# Patient Record
Sex: MALE | URBAN - METROPOLITAN AREA
[De-identification: names, ages, dates, MRNs, and addresses within clinical notes are randomized per-mention and may not be internally consistent; named-entity substitution may affect disease eponyms.]

---

## 2020-08-20 ENCOUNTER — OFFICE VISIT CONVERTED (OUTPATIENT)
Dept: NEUROLOGY | Facility: CLINIC | Age: 35
End: 2020-08-20
Attending: NURSE PRACTITIONER

## 2020-10-27 ENCOUNTER — OFFICE VISIT CONVERTED (OUTPATIENT)
Dept: NEUROLOGY | Facility: CLINIC | Age: 35
End: 2020-10-27
Attending: NURSE PRACTITIONER

## 2021-05-10 NOTE — H&P
History and Physical      Patient Name: Chet Cabrera   Patient ID: [id number]   Sex: Male   YOB: 1985        Visit Date: August 20, 2020    Provider: ARMAND Pruitt   Location: [facility]   Location Address: [address] [place] [address]  [id number]   Location Phone: [phone]          Chief Complaint  · Migraines      History Of Present Illness  Chet Cabrera is a 35 year old male who presents today to [facility] today referred from Nash Lozoya for evaluation of headaches.   He reports that he developed headaches within the last year. Since that time, his headaches have progressively worsened.   Currently, he reports headaches that are located temporal. He characterizes the headaches as 8/10 in severity, sharp and throbbing in nature with associated phonophobia. His headaches last 8-10 hours. He reports 18 headache days per month. He denies associated aura. He denies focal numbness, weakness, speech, and vision changes.   Triggers: Denies any known triggers   Symptoms improved by: Dark quiet room and Sleep   He states he is sleeping poorly. Reports getting 3-4 hours of sleep per night. Denies snoring. Reports unrefreshing sleep.   Prior prophylactic medications include: Flexeril   He uses abortive therapy such as:   Caffeine Use: 3 servings   Independently Reviewed: CT Head   History of Kidney Stones: No   He denies a family history of cerebral aneurysm.       Past Medical History  ***No Significant Medical History         Past Surgical History  Gallbladder         Medication List  Advil 200 mg oral tablet; cyclobenzaprine 5 mg oral tablet; Tylenol oral         Allergy List  NO KNOWN DRUG ALLERGIES       Allergies Reconciled  Family Medical History  Family history of Arthritis         Social History  Alcohol (Current some day); lives with children; ; Tobacco (Current every day); Working         Review of Systems  · Constitutional  o Denies  o :  "chills, excessive sweating, fatigue, fever, sycope/passing out, weight gain, weight loss  · Eyes  o Admits  o : changes in vision  o Denies  o : blurry vision, double vision  · HENT  o Admits  o : seasonal allergies, headaches  o Denies  o : loss of hearing, ringing in the ears, ear aches, sore throat, nasal congestion, sinus pain, nose bleeds  · Cardiovascular  o Denies  o : blood clots, swollen legs, anemia, easy burising or bleeding, transfusions  · Respiratory  o Denies  o : shortness of breath, dry cough, productive cough, pneumonia, COPD  · Gastrointestinal  o Denies  o : difficulty swallowing, reflux  · Genitourinary  o Denies  o : incontinence  · Neurologic  o Admits  o : headache, dizziness/vertigo, difficulty with sleep  o Denies  o : seizure, stroke, tremor, loss of balance, falls, numbness/tingling/paresthesia , difficulty with coordination, difficulty with dexterity, weakness  · Musculoskeletal  o Admits  o : neck stiffness/pain, muscle aches, joint pain, low back pain  o Denies  o : swollen lymph nodes, weakness, spasms, sciatica, pain radiating in arm, pain radiating in leg  · Endocrine  o Denies  o : diabetes, thyroid disorder  · Psychiatric  o Denies  o : anxiety, depression      Vitals  Date Time BP Position Site L\R Cuff Size HR RR TEMP (F) WT  HT  BMI kg/m2 BSA m2 O2 Sat HC       08/20/2020 02:09 PM        97.6         08/20/2020 02:48 /74 Sitting    56 - R   158lbs 1oz 6'  1\" 20.85 1.92           Physical Examination  · Constitutional  o Appearance  o : well-nourished, well groomed, in no apparent distress  · Eyes  o Pupils and Irises  o : Pupils equal, round, and reactive to light and accommodation bilaterally  · Respiratory  o Auscultation of Lungs  o : Lungs were clear to ascultation bilaterally. No wheezes, rhonchi or rales were appreciated.  · Cardiovascular  o Heart  o :   § Auscultation of Heart  § : Regular rate and rhythm, no murmurs, gallops or rubs were " appreciated.  o Peripheral Vascular System  o :   § Extremities  § : No peripheral edema was appreciated  · Musculoskeletal  o General  o : Normal bulk and normal tone throughout. 5/5 motor strength throughout and symmetric. Normal gait and station.  · Neurologic  o Mental Status Examination  o :   § Orientation  § : Alert and oriented to person, place, and time,  § Speech/Language  § : Intact naming, comprehension, and repetition. No dysarthria.  § Memory  § : Immediate recall is 3/3. Recall at 5 minutes is 3/3.   § Attention  § : Attention span is intact to serial 7 examination and finger tapping.   § Fund of Knowledge  § : Adequate fund of knowledge  o Cranial Nerves  o : Pupils are equal, round and reactive to light. Extraocular movements are intact. Visual fields are full. Fundoscopic examination reveals sharp disc bilaterally. Sensation in the V1-V3 distribution is intact and symmetric. Muscles of mastication are strong and symmetric. Muscles of facial expression are strong and symmetric. Hearing is intact. Palatal raise is intact and symmetric. Uvula is midline. Shoulder shrug is strong. Tongue protrudes in the midline.  o Motor Examination  o :   § RUE Strength  § : strength normal  § LUE Strength  § : strength normal  § RLE Strength  § : strength normal  § LLE Strength  § : strength normal  o Reflexes  o : 2+ reflexes throughout and symmetric. Negative Staton. Negative Babinski.   o Sensation  o : Intact sensation to light touch, pinprick, vibration and proprioception throughout.  o Gait and Station  o :   § Gait Screening  § : Normal, narrow based gait, with a normal arm swing.  o Coordination  o : Intact finger to nose and heel to shin. Rapid alternating movements are intact in the upper and lower extremities.     Tenderness with palpation of left temple.           Assessment  · New onset headache     784.0/R51  Will order CRP, Sed Rate and Lyme to further evaluate new onset headache. Has temporal  tenderness with palpation. Temporal arteritis is in the differential. Will start amitriptyline for preventative therapy of headache.     Problems Reconciled  Plan  · Orders  o CRP (Inflammation) [facility] (72578) - 784.0/R51 - 08/20/2020  o Sed rate (36851) - 784.0/R51 - 08/20/2020  o Lyme Disease (IgG/IgM Total by EIA) (87789) - 784.0/R51 - 08/20/2020  o ACO-17: Screened for tobacco use AND received tobacco cessation intervention (4004F) - - 08/25/2020  · Medications  o amitriptyline 25 mg oral tablet   SIG: take 1 tablet (25 mg) by oral route once daily at bedtime for 30 days   DISP: (30) tablets with 3 refills  Prescribed on 08/20/2020     o Medications have been Reconciled  o Transition of Care or Provider Policy  · Instructions  o Encouraged to follow-up with Primary Care Provider for preventative care.  o Call or Return if symptoms worsen or persist.  o Handouts provided regarding new medications listed above.  o Follow Up in 2 months.  o Electronically Identified Patient Education Materials Provided Electronically  · Disposition  o Call or Return if symptoms worsen or persist.            Electronically Signed by: ARMAND Pruitt -Author on August 25, 2020 09:07:28 AM

## 2021-05-13 NOTE — PROGRESS NOTES
Progress Note      Patient Name: Chet Cabrera   Patient ID: [id number]   Sex: Male   YOB: 1985    Referring Provider: Nash Lozoya    Visit Date: October 27, 2020    Provider: ARMAND Pruitt   Location: [facility]   Location Address: [address] [place] [address]  [id number]   Location Phone: [phone]          Chief Complaint  · Migraines      History Of Present Illness  Chet Cabrera is a 35 year old male who presents today to [facility] today referred from Nash Lozoya for evaluation of headaches.   He reports that he developed headaches within the last year. Since that time, his headaches have progressively worsened.   Currently, he reports headaches that are located temporal. He characterizes the headaches as 8/10 in severity, sharp and throbbing in nature with associated phonophobia. His headaches last 8-10 hours. He reports 18 headache days per month. He denies associated aura. He denies focal numbness, weakness, speech, and vision changes.   Triggers: Denies any known triggers   Symptoms improved by: Dark quiet room and Sleep   He states he is sleeping poorly. Reports getting 3-4 hours of sleep per night. Denies snoring. Reports unrefreshing sleep.   Prior prophylactic medications include: Flexeril, amitriptyline   He uses abortive therapy such as:   Caffeine Use: 3 servings   Independently Reviewed: CT Head   History of Kidney Stones: No   He denies a family history of cerebral aneurysm.   Chet Cabrera is a 35 year old male who presents today to [facility] today referred from Nash Lozoya for follow up. Is not tolerating the amitriptyline, states he's been having blurred vision and insomnia. Headache frequency has decreased to about 2 headache days per week.   Independently Reviewed: Labs       Past Medical History  ***No Significant Medical History         Past Surgical History  Gallbladder         Medication  "List  Advil 200 mg oral tablet; cyclobenzaprine 5 mg oral tablet; Tylenol oral         Allergy List  NO KNOWN DRUG ALLERGIES       Allergies Reconciled  Family Medical History  Family history of Arthritis         Social History  Alcohol (Current some day); lives with children; ; Tobacco (Current every day); Working         Review of Systems  · Constitutional  o Denies  o : chills, excessive sweating, fatigue, fever, sycope/passing out, weight gain, weight loss  · Eyes  o Admits  o : changes in vision  o Denies  o : blurry vision, double vision  · HENT  o Admits  o : seasonal allergies, headaches  o Denies  o : loss of hearing, ringing in the ears, ear aches, sore throat, nasal congestion, sinus pain, nose bleeds  · Cardiovascular  o Denies  o : blood clots, swollen legs, anemia, easy burising or bleeding, transfusions  · Respiratory  o Denies  o : shortness of breath, dry cough, productive cough, pneumonia, COPD  · Gastrointestinal  o Denies  o : difficulty swallowing, reflux  · Genitourinary  o Denies  o : incontinence  · Neurologic  o Admits  o : headache, dizziness/vertigo, difficulty with sleep  o Denies  o : seizure, stroke, tremor, loss of balance, falls, numbness/tingling/paresthesia , difficulty with coordination, difficulty with dexterity, weakness  · Musculoskeletal  o Admits  o : neck stiffness/pain, muscle aches, joint pain, low back pain  o Denies  o : swollen lymph nodes, weakness, spasms, sciatica, pain radiating in arm, pain radiating in leg  · Endocrine  o Denies  o : diabetes, thyroid disorder  · Psychiatric  o Denies  o : anxiety, depression      Vitals  Date Time BP Position Site L\R Cuff Size HR RR TEMP (F) WT  HT  BMI kg/m2 BSA m2 O2 Sat FR L/min FiO2        10/27/2020 03:28 /64 Sitting    92 - R   160lbs 0oz 6'  1\" 21.11 1.93             Physical Examination  · Constitutional  o Appearance  o : well-nourished, well groomed, in no apparent distress  · Eyes  o Pupils and " Irises  o : Pupils equal, round, and reactive to light and accommodation bilaterally  · Respiratory  o Auscultation of Lungs  o : Lungs were clear to ascultation bilaterally. No wheezes, rhonchi or rales were appreciated.  · Cardiovascular  o Heart  o :   § Auscultation of Heart  § : Regular rate and rhythm, no murmurs, gallops or rubs were appreciated.  o Peripheral Vascular System  o :   § Extremities  § : No peripheral edema was appreciated  · Musculoskeletal  o General  o : Normal bulk and normal tone throughout. 5/5 motor strength throughout and symmetric. Normal gait and station.  · Neurologic  o Mental Status Examination  o :   § Orientation  § : Alert and oriented to person, place, and time,  § Speech/Language  § : Intact naming, comprehension, and repetition. No dysarthria.  § Memory  § : Immediate recall is 3/3. Recall at 5 minutes is 3/3.   § Attention  § : Attention span is intact to serial 7 examination and finger tapping.   § Fund of Knowledge  § : Adequate fund of knowledge  o Cranial Nerves  o : Pupils are equal, round and reactive to light. Extraocular movements are intact. Visual fields are full. Fundoscopic examination reveals sharp disc bilaterally. Sensation in the V1-V3 distribution is intact and symmetric. Muscles of mastication are strong and symmetric. Muscles of facial expression are strong and symmetric. Hearing is intact. Palatal raise is intact and symmetric. Uvula is midline. Shoulder shrug is strong. Tongue protrudes in the midline.  o Motor Examination  o :   § RUE Strength  § : strength normal  § LUE Strength  § : strength normal  § RLE Strength  § : strength normal  § LLE Strength  § : strength normal  o Reflexes  o : 2+ reflexes throughout and symmetric. Negative Staton. Negative Babinski.   o Sensation  o : Intact sensation to light touch, pinprick, vibration and proprioception throughout.  o Gait and Station  o :   § Gait Screening  § : Normal, narrow based gait, with a normal  arm swing.  o Coordination  o : Intact finger to nose and heel to shin. Rapid alternating movements are intact in the upper and lower extremities.          Assessment  · New onset headache     784.0/R51  Will discontinue amitriptyline. Will start topiramate for preventative therapy of migraine. Will start Imitrex PRN for abortive therapy.      Plan  · Medications  o topiramate 50 mg oral tablet   SIG: take 1 tablet by oral route 2 times a day for 30 days one tablet qHS for 2 weeks, then BID   DISP: (60) Tablet with 5 refills  Prescribed on 10/27/2020     o Imitrex 50 mg oral tablet   SIG: Take 1 tab at HA onset, may rpt in 2 hrs. No more than 2 tabs/day or 2 days/week   DISP: (9) Tablet with 5 refills  Prescribed on 10/27/2020     o amitriptyline 25 mg oral tablet   SIG: take 1 tablet (25 mg) by oral route once daily at bedtime for 30 days   DISP: (30) tablets with 3 refills  Discontinued on 10/27/2020     o Medications have been Reconciled  o Transition of Care or Provider Policy  · Instructions  o Call or Return if symptoms persist.  o Follow up in 3 months.  · Disposition  o Call or Return if symptoms worsen or persist.            Electronically Signed by: ARMAND Pruitt -Author on October 29, 2020 08:28:54 AM

## 2021-05-14 VITALS
SYSTOLIC BLOOD PRESSURE: 165 MMHG | BODY MASS INDEX: 21.2 KG/M2 | DIASTOLIC BLOOD PRESSURE: 64 MMHG | HEIGHT: 73 IN | WEIGHT: 160 LBS | HEART RATE: 92 BPM

## 2021-05-14 VITALS
HEART RATE: 56 BPM | DIASTOLIC BLOOD PRESSURE: 74 MMHG | SYSTOLIC BLOOD PRESSURE: 128 MMHG | HEIGHT: 73 IN | TEMPERATURE: 97.6 F | BODY MASS INDEX: 20.95 KG/M2 | WEIGHT: 158.06 LBS

## 2023-05-05 ENCOUNTER — OFFICE VISIT (OUTPATIENT)
Dept: BEHAVIORAL HEALTH | Facility: CLINIC | Age: 38
End: 2023-05-05
Payer: COMMERCIAL

## 2023-05-05 VITALS
BODY MASS INDEX: 19.64 KG/M2 | SYSTOLIC BLOOD PRESSURE: 124 MMHG | DIASTOLIC BLOOD PRESSURE: 78 MMHG | HEIGHT: 73 IN | WEIGHT: 148.2 LBS | HEART RATE: 60 BPM

## 2023-05-05 DIAGNOSIS — F33.1 MODERATE EPISODE OF RECURRENT MAJOR DEPRESSIVE DISORDER: ICD-10-CM

## 2023-05-05 DIAGNOSIS — F41.1 GENERALIZED ANXIETY DISORDER: Primary | ICD-10-CM

## 2023-05-05 DIAGNOSIS — F51.01 PRIMARY INSOMNIA: ICD-10-CM

## 2023-05-05 RX ORDER — VILAZODONE HYDROCHLORIDE 20 MG/1
TABLET ORAL
COMMUNITY
Start: 2023-04-25 | End: 2023-05-10 | Stop reason: DRUGHIGH

## 2023-05-05 RX ORDER — ARIPIPRAZOLE 2 MG/1
2 TABLET ORAL DAILY
Qty: 30 TABLET | Refills: 1 | Status: SHIPPED | OUTPATIENT
Start: 2023-05-05 | End: 2023-05-10 | Stop reason: SINTOL

## 2023-05-05 RX ORDER — LOTEPREDNOL ETABONATE 5 MG/ML
SUSPENSION/ DROPS OPHTHALMIC
COMMUNITY
Start: 2023-02-16 | End: 2023-05-05

## 2023-05-05 RX ORDER — ZOLPIDEM TARTRATE 5 MG/1
TABLET ORAL
COMMUNITY
Start: 2023-03-30 | End: 2023-05-05

## 2023-05-05 RX ORDER — AMOXICILLIN 500 MG/1
CAPSULE ORAL
COMMUNITY
Start: 2023-03-04 | End: 2023-05-05

## 2023-05-05 RX ORDER — ESZOPICLONE 1 MG/1
TABLET, FILM COATED ORAL
COMMUNITY
Start: 2023-04-26
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
## 2023-05-08 ENCOUNTER — TELEPHONE (OUTPATIENT)
Dept: PSYCHIATRY | Facility: CLINIC | Age: 38
End: 2023-05-08
Payer: COMMERCIAL

## 2023-05-08 NOTE — TELEPHONE ENCOUNTER
Patient called and requested that their provider give them a call.     Patient stated that they had questions regarding the medication they were put on last week. Also had questions about the side effects.

## 2023-05-09 NOTE — PATIENT INSTRUCTIONS
1.  Please return to clinic at your next scheduled visit.  Please contact the clinic ([phone]) at least 24 hours prior in the event you need to cancel.  2.  Do no harm to yourself or others.    3.  Avoid alcohol and drugs.    4.  Take all medications as prescribed.  Please contact the clinic with any concerns. If you are in need of medication refills, please call the clinic at [phone].    5. Should you want to get in touch with your provider, ARMAND Loredo, please contact the office ([phone]), and staff will be able to page [facility] directly.  6. In the event you have personal crisis, contact the following crisis numbers: Suicide Prevention Hotline 1-158.311.1341; [facility] Helpline 4-508-829-HGCE; [facility] Emergency Room [phone]; text HELLO to 426998; or 852.     SPECIFIC RECOMMENDATIONS:     1.      Medications discussed at this encounter: VIIBRYD (VILAZODONE) Start Viibryd 10 mg by mouth daily in the morning with food for 7 days, then 20 mg by mouth daily in the morning with food to target depressed mood and anxiety. Risks, benefits, alternatives discussed with patient including diarrhea, nausea, vomiting, dry mouth and insomnia. After discussion of these risks and benefits, the patient voiced understanding and agreed to proceed.                        2.      Psychotherapy recommendations: We will continue therapy at future visits.     3.     Return to clinic: 1 week

## 2023-05-09 NOTE — TELEPHONE ENCOUNTER
"Phoned patient.  Patient has tried taking the Abilify at various times in the day and is unable to tolerate the symptoms.  At night it caused strange dreams.  During the day to cause sedation.  In the afternoon patient states he felt \"weird\"  Discontinue Abilify  We will discuss alternative medication options at appointment in a.m.    ARMAND Loredo, PMHNP-BC  [facility]  Office: [phone]    "
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
## 2023-05-09 NOTE — PATIENT INSTRUCTIONS
1.  Please return to clinic at your next scheduled visit.  Please contact the clinic ([phone]) at least 24 hours prior in the event you need to cancel.  2.  Do no harm to yourself or others.    3.  Avoid alcohol and drugs.    4.  Take all medications as prescribed.  Please contact the clinic with any concerns. If you are in need of medication refills, please call the clinic at [phone].    5. Should you want to get in touch with your provider, ARMAND Loredo, please contact the office ([phone]), and staff will be able to page Opal directly.  6. In the event you have personal crisis, contact the following crisis numbers: Suicide Prevention Hotline 1-338.531.3720; [facility] Helpline 3-066-240-KHCP; [facility] Emergency Room [phone]; text HELLO to 810084; or 389.     SPECIFIC RECOMMENDATIONS:     1.      Medications discussed at this encounter: ABILIFY (ARIPIPRAZOLE) Risks, benefits, alternatives discussed with patient including increased energy, exacerbation of irritability, akathisia, GI upset, orthostatic hypotension, increased appetite, tardive dyskinesia.  After discussion of these risks and benefits, the patient voiced understanding and agreed to proceed.                      2.      Psychotherapy recommendations: We will continue therapy at future visits.     3.     Return to clinic: Follow-up 1 week

## 2023-05-10 ENCOUNTER — OFFICE VISIT (OUTPATIENT)
Dept: PSYCHIATRY | Facility: CLINIC | Age: 38
End: 2023-05-10
Payer: COMMERCIAL

## 2023-05-10 VITALS
HEIGHT: 73 IN | HEART RATE: 73 BPM | BODY MASS INDEX: 19.96 KG/M2 | SYSTOLIC BLOOD PRESSURE: 120 MMHG | WEIGHT: 150.6 LBS | DIASTOLIC BLOOD PRESSURE: 72 MMHG

## 2023-05-10 DIAGNOSIS — F41.1 GENERALIZED ANXIETY DISORDER: ICD-10-CM

## 2023-05-10 DIAGNOSIS — F51.01 PRIMARY INSOMNIA: ICD-10-CM

## 2023-05-10 DIAGNOSIS — F33.1 MODERATE EPISODE OF RECURRENT MAJOR DEPRESSIVE DISORDER: Primary | ICD-10-CM

## 2023-05-10 PROBLEM — R68.89 FLU-LIKE SYMPTOMS: Status: ACTIVE | Noted: 2023-05-10

## 2023-05-10 PROBLEM — IMO0002 FOREIGN BODY: Status: ACTIVE | Noted: 2023-05-10

## 2023-05-10 PROBLEM — R51.9 HEADACHE: Status: ACTIVE | Noted: 2023-05-10

## 2023-05-10 PROBLEM — S63.619A SPRAIN OF FINGER, LEFT: Status: ACTIVE | Noted: 2023-05-10

## 2023-05-10 PROBLEM — R10.9 ABDOMINAL PAIN OF UNKNOWN ETIOLOGY: Status: ACTIVE | Noted: 2023-05-10

## 2023-05-10 PROBLEM — S60.222A CONTUSION OF LEFT HAND: Status: ACTIVE | Noted: 2023-05-10

## 2023-05-10 RX ORDER — VILAZODONE HYDROCHLORIDE 40 MG/1
40 TABLET ORAL DAILY
Qty: 30 TABLET | Refills: 1 | Status: SHIPPED | OUTPATIENT
Start: 2023-05-10

## 2023-05-11 ENCOUNTER — TELEPHONE (OUTPATIENT)
Dept: PSYCHIATRY | Facility: CLINIC | Age: 38
End: 2023-05-11
Payer: COMMERCIAL

## 2023-05-11 NOTE — TELEPHONE ENCOUNTER
ATTEMPTED TO CONTACT PT PER PROVIDER'S INSTRUCTIONS    PT(PATIENT) WILL NEED TO COMPLETE PHQ9/GAD7 VIA PHONE FOR Office Visit with Opal Huang APRN (05/10/2023)     NO ANSWER      LEFT VOICEMAIL WITH INSTRUCTIONS TO RETURN CALL TO OFFICE AT [phone]

## 2023-05-15 NOTE — TELEPHONE ENCOUNTER
I CALLED PT TO GO OVER PHQ AND MARGE-7.    I SPOKE WITH PT AND WENT OVER THESE ASSESSMENTS.     PT HAS COMPLETED THESE ASSESSMENTS.     ROUTED TO PROVIDER FOR REVIEW.

## 2023-05-19 ENCOUNTER — CLINICAL SUPPORT (OUTPATIENT)
Dept: FAMILY MEDICINE CLINIC | Facility: CLINIC | Age: 38
End: 2023-05-19
Payer: COMMERCIAL

## 2023-05-19 ENCOUNTER — OFFICE VISIT (OUTPATIENT)
Dept: BEHAVIORAL HEALTH | Facility: CLINIC | Age: 38
End: 2023-05-19
Payer: COMMERCIAL

## 2023-05-19 VITALS
HEIGHT: 73 IN | WEIGHT: 153.5 LBS | BODY MASS INDEX: 20.34 KG/M2 | DIASTOLIC BLOOD PRESSURE: 71 MMHG | HEART RATE: 66 BPM | SYSTOLIC BLOOD PRESSURE: 127 MMHG

## 2023-05-19 DIAGNOSIS — F51.01 PRIMARY INSOMNIA: ICD-10-CM

## 2023-05-19 DIAGNOSIS — Z51.81 MEDICATION MONITORING ENCOUNTER: ICD-10-CM

## 2023-05-19 DIAGNOSIS — F51.01 PRIMARY INSOMNIA: Primary | ICD-10-CM

## 2023-05-19 DIAGNOSIS — F33.1 MODERATE EPISODE OF RECURRENT MAJOR DEPRESSIVE DISORDER: ICD-10-CM

## 2023-05-19 DIAGNOSIS — Z79.899 MEDICATION MANAGEMENT: ICD-10-CM

## 2023-05-19 DIAGNOSIS — F41.1 GENERALIZED ANXIETY DISORDER: Primary | ICD-10-CM

## 2023-05-19 LAB
POC AMPHETAMINES: NEGATIVE
POC BARBITURATES: NEGATIVE
POC BENZODIAZEPHINES: NEGATIVE
POC COCAINE: NEGATIVE
POC METHADONE: NEGATIVE
POC METHAMPHETAMINE SCREEN URINE: NEGATIVE
POC OPIATES: NEGATIVE
POC OXYCODONE: NEGATIVE
POC PHENCYCLIDINE: NEGATIVE
POC PROPOXYPHENE: NEGATIVE
POC THC: NEGATIVE
POC TRICYCLIC ANTIDEPRESSANTS: NEGATIVE

## 2023-05-19 PROCEDURE — 80305 DRUG TEST PRSMV DIR OPT OBS: CPT | Performed by: NURSE PRACTITIONER
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
## 2023-05-19 NOTE — PATIENT INSTRUCTIONS
1.  Please return to clinic at your next scheduled visit.  Please contact the clinic ([phone]) at least 24 hours prior in the event you need to cancel.  2.  Do no harm to yourself or others.    3.  Avoid alcohol and drugs.    4.  Take all medications as prescribed.  Please contact the clinic with any concerns. If you are in need of medication refills, please call the clinic at [phone].    5. Should you want to get in touch with your provider, ARMAND Loredo, please contact the office ([phone]), and staff will be able to page Opal directly.  6. In the event you have personal crisis, contact the following crisis numbers: Suicide Prevention Hotline 1-682.245.6796; MAXINE Helpline 4-896-571-GPHR; [facility] [phone]; text HELLO to 992445; or 844.     SPECIFIC RECOMMENDATIONS:     1.      Medications discussed at this encounter: LUNESTA (ESZOPICLONE) Risks, benefits, alternatives discussed with patient including sedation, dizziness, falls risk, GI upset, amnesia, grogginess the following day.  After discussion of these risks and benefits, the patient voiced understanding and agreed to proceed.                      2.      Psychotherapy recommendations: We will continue therapy at future visits.     3.     Return to clinic: 1 week

## 2023-05-19 NOTE — TELEPHONE ENCOUNTER
Medication management    Increase Lunesta to 2 mg at bedtime     UDS satisfactory  ELENA satisfactory  CSA in record

## 2023-05-22 RX ORDER — ESZOPICLONE 2 MG/1
2 TABLET, FILM COATED ORAL NIGHTLY PRN
Qty: 30 TABLET | Refills: 1 | Status: SHIPPED | OUTPATIENT
Start: 2023-05-26 | End: 2024-05-25

## 2023-05-25 ENCOUNTER — OFFICE VISIT (OUTPATIENT)
Dept: BEHAVIORAL HEALTH | Facility: CLINIC | Age: 38
End: 2023-05-25
Payer: COMMERCIAL

## 2023-05-25 VITALS
HEIGHT: 73 IN | HEART RATE: 63 BPM | DIASTOLIC BLOOD PRESSURE: 70 MMHG | SYSTOLIC BLOOD PRESSURE: 127 MMHG | WEIGHT: 152.1 LBS | BODY MASS INDEX: 20.16 KG/M2

## 2023-05-25 DIAGNOSIS — F41.1 GENERALIZED ANXIETY DISORDER: Primary | ICD-10-CM

## 2023-05-25 DIAGNOSIS — Z79.899 MEDICATION MANAGEMENT: ICD-10-CM

## 2023-05-25 DIAGNOSIS — F33.1 MODERATE EPISODE OF RECURRENT MAJOR DEPRESSIVE DISORDER: ICD-10-CM

## 2023-05-25 DIAGNOSIS — F51.01 PRIMARY INSOMNIA: ICD-10-CM

## 2023-05-25 NOTE — PATIENT INSTRUCTIONS
1.  Please return to clinic at your next scheduled visit.  Please contact the clinic ([phone]) at least 24 hours prior in the event you need to cancel.  2.  Do no harm to yourself or others.    3.  Avoid alcohol and drugs.    4.  Take all medications as prescribed.  Please contact the clinic with any concerns. If you are in need of medication refills, please call the clinic at [phone].    5. Should you want to get in touch with your provider, ARMAND Loredo, please contact the office ([phone]), and staff will be able to page Opal directly.  6. In the event you have personal crisis, contact the following crisis numbers: Suicide Prevention Hotline 1-214.283.6644; MAXINE Helpline 2-723-462-RVBS; [facility] [phone]; text HELLO to 901278; or 056.     SPECIFIC RECOMMENDATIONS:     1.      Medications discussed at this encounter:                     2.      Psychotherapy recommendations: We will continue therapy at future visits.     3.     Return to clinic:  1 Week
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
## 2023-06-09 ENCOUNTER — OFFICE VISIT (OUTPATIENT)
Dept: BEHAVIORAL HEALTH | Facility: CLINIC | Age: 38
End: 2023-06-09
Payer: COMMERCIAL

## 2023-06-09 VITALS
DIASTOLIC BLOOD PRESSURE: 70 MMHG | WEIGHT: 151 LBS | HEART RATE: 76 BPM | BODY MASS INDEX: 20.01 KG/M2 | SYSTOLIC BLOOD PRESSURE: 134 MMHG | HEIGHT: 73 IN

## 2023-06-09 DIAGNOSIS — F33.1 MODERATE EPISODE OF RECURRENT MAJOR DEPRESSIVE DISORDER: ICD-10-CM

## 2023-06-09 DIAGNOSIS — F41.1 GENERALIZED ANXIETY DISORDER: Primary | ICD-10-CM

## 2023-06-09 RX ORDER — BUPROPION HYDROCHLORIDE 150 MG/1
150 TABLET ORAL EVERY MORNING
Qty: 30 TABLET | Refills: 2 | Status: SHIPPED | OUTPATIENT
Start: 2023-06-09 | End: 2024-06-08

## 2023-06-09 NOTE — PATIENT INSTRUCTIONS
1.  Please return to clinic at your next scheduled visit.  Please contact the clinic ([phone]) at least 24 hours prior in the event you need to cancel.  2.  Do no harm to yourself or others.    3.  Avoid alcohol and drugs.    4.  Take all medications as prescribed.  Please contact the clinic with any concerns. If you are in need of medication refills, please call the clinic at [phone].    5. Should you want to get in touch with your provider, ARMAND Loredo, please contact the office ([phone]), and staff will be able to page [facility] directly.  6. In the event you have personal crisis, contact the following crisis numbers: Suicide Prevention Hotline 1-898.848.8337; [facility] Helpline 7-925-497-LJVD; [facility] [phone]; text HELLO to 090149; or 412.     SPECIFIC RECOMMENDATIONS:     1.      Medications discussed at this encounter: WELLBUTRIN XL (BUPROPION) Risks, benefits, alternatives discussed with patient including nausea, GI upset, increased energy, exacerbation of irritability, insomnia, lowering of seizure threshold.  After discussion of these risks and benefits, the patient voiced understanding and agreed to proceed.                      2.      Psychotherapy recommendations: We will continue therapy at future visits.     3.     Return to clinic:  1 Week
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
## 2023-06-16 ENCOUNTER — OFFICE VISIT (OUTPATIENT)
Dept: BEHAVIORAL HEALTH | Facility: CLINIC | Age: 38
End: 2023-06-16
Payer: COMMERCIAL

## 2023-06-16 VITALS
DIASTOLIC BLOOD PRESSURE: 73 MMHG | HEART RATE: 61 BPM | BODY MASS INDEX: 20.29 KG/M2 | SYSTOLIC BLOOD PRESSURE: 131 MMHG | WEIGHT: 153.1 LBS | HEIGHT: 73 IN

## 2023-06-16 DIAGNOSIS — F33.1 MODERATE EPISODE OF RECURRENT MAJOR DEPRESSIVE DISORDER: Primary | ICD-10-CM

## 2023-06-16 DIAGNOSIS — F41.1 GENERALIZED ANXIETY DISORDER: ICD-10-CM

## 2023-06-16 DIAGNOSIS — F51.01 PRIMARY INSOMNIA: ICD-10-CM

## 2023-06-16 NOTE — PATIENT INSTRUCTIONS
1.  Please return to clinic at your next scheduled visit.  Please contact the clinic ([phone]) at least 24 hours prior in the event you need to cancel.  2.  Do no harm to yourself or others.    3.  Avoid alcohol and drugs.    4.  Take all medications as prescribed.  Please contact the clinic with any concerns. If you are in need of medication refills, please call the clinic at [phone].    5. Should you want to get in touch with your provider, ARMAND Loredo, please contact the office ([phone]), and staff will be able to page [facility] directly.  6. In the event you have personal crisis, contact the following crisis numbers: Suicide Prevention Hotline 1-406.135.5109; [facility] Helpline 7-428-708-EQZK; [facility] Emergency Room [phone]; text HELLO to 055692; or 869.     SPECIFIC RECOMMENDATIONS:     1.      Medications discussed at this encounter: WELLBUTRIN XL (BUPROPION) Risks, benefits, alternatives discussed with patient including nausea, GI upset, increased energy, exacerbation of irritability, insomnia, lowering of seizure threshold.  After discussion of these risks and benefits, the patient voiced understanding and agreed to proceed.                      2.      Psychotherapy recommendations: We will continue therapy at future visits.     3.     Return to clinic:  1  week
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
## 2023-07-20 ENCOUNTER — OFFICE VISIT (OUTPATIENT)
Dept: BEHAVIORAL HEALTH | Facility: CLINIC | Age: 38
End: 2023-07-20
Payer: COMMERCIAL

## 2023-07-20 VITALS
DIASTOLIC BLOOD PRESSURE: 70 MMHG | HEIGHT: 73 IN | WEIGHT: 157.7 LBS | SYSTOLIC BLOOD PRESSURE: 123 MMHG | HEART RATE: 63 BPM | BODY MASS INDEX: 20.9 KG/M2

## 2023-07-20 DIAGNOSIS — F41.1 GENERALIZED ANXIETY DISORDER: ICD-10-CM

## 2023-07-20 DIAGNOSIS — F33.1 MODERATE EPISODE OF RECURRENT MAJOR DEPRESSIVE DISORDER: Primary | ICD-10-CM

## 2023-07-20 DIAGNOSIS — F51.01 PRIMARY INSOMNIA: ICD-10-CM

## 2023-07-20 NOTE — PATIENT INSTRUCTIONS
1.  Please return to clinic at your next scheduled visit.  Please contact the clinic ([phone]) at least 24 hours prior in the event you need to cancel.  2.  Do no harm to yourself or others.    3.  Avoid alcohol and drugs.    4.  Take all medications as prescribed.  Please contact the clinic with any concerns. If you are in need of medication refills, please call the clinic at [phone].    5. Should you want to get in touch with your provider, ARMAND Loredo, please contact the office ([phone]), and staff will be able to page [facility] directly.  6. In the event you have personal crisis, contact the following crisis numbers: Suicide Prevention Hotline 1-384.316.9675; [facility] Helpline 2-471-984-MSFW; [facility] Emergency Room [phone]; text HELLO to 839474; or 429.     SPECIFIC RECOMMENDATIONS:     1.      Medications discussed at this encounter:                     2.      Psychotherapy recommendations: We will continue therapy at future visits.    Outgoing ambulatory referral for individual psychotherapy to  [address] [id number]  Phone [phone]  [email or web address]    3.     Return to clinic:  2 Weeks
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
## 2023-08-01 DIAGNOSIS — F51.01 PRIMARY INSOMNIA: ICD-10-CM

## 2023-08-01 RX ORDER — ESZOPICLONE 2 MG/1
TABLET, FILM COATED ORAL
Qty: 30 TABLET | Refills: 2 | Status: SHIPPED | OUTPATIENT
Start: 2023-08-01

## 2023-08-03 ENCOUNTER — OFFICE VISIT (OUTPATIENT)
Dept: BEHAVIORAL HEALTH | Facility: CLINIC | Age: 38
End: 2023-08-03
Payer: COMMERCIAL

## 2023-08-03 VITALS
DIASTOLIC BLOOD PRESSURE: 71 MMHG | HEART RATE: 68 BPM | WEIGHT: 155 LBS | BODY MASS INDEX: 20.54 KG/M2 | SYSTOLIC BLOOD PRESSURE: 123 MMHG | HEIGHT: 73 IN

## 2023-08-03 DIAGNOSIS — F51.01 PRIMARY INSOMNIA: ICD-10-CM

## 2023-08-03 DIAGNOSIS — F41.1 GENERALIZED ANXIETY DISORDER: ICD-10-CM

## 2023-08-03 DIAGNOSIS — F33.1 MODERATE EPISODE OF RECURRENT MAJOR DEPRESSIVE DISORDER: Primary | ICD-10-CM

## 2023-08-03 RX ORDER — BUPROPION HYDROCHLORIDE 150 MG/1
150 TABLET ORAL EVERY MORNING
Qty: 30 TABLET | Refills: 1 | Status: SHIPPED | OUTPATIENT
Start: 2023-08-03 | End: 2024-08-02

## 2023-08-03 RX ORDER — VILAZODONE HYDROCHLORIDE 40 MG/1
40 TABLET ORAL DAILY
Qty: 30 TABLET | Refills: 1 | Status: SHIPPED | OUTPATIENT
Start: 2023-08-03
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
## 2023-08-03 NOTE — PATIENT INSTRUCTIONS
1.  Please return to clinic at your next scheduled visit.  Please contact the clinic ([phone]) at least 24 hours prior in the event you need to cancel.  2.  Do no harm to yourself or others.    3.  Avoid alcohol and drugs.    4.  Take all medications as prescribed.  Please contact the clinic with any concerns. If you are in need of medication refills, please call the clinic at [phone].    5. Should you want to get in touch with your provider, ARMAND Loredo, please contact the office ([phone]), and staff will be able to page pOal directly.  6. In the event you have personal crisis, contact the following crisis numbers: Suicide Prevention Hotline 1-850.244.2925; [facility] Helpline 8-566-488-WWHJ; [facility] Emergency Room [phone]; text HELLO to 808225; or 494.     SPECIFIC RECOMMENDATIONS:     1.      Medications discussed at this encounter:                     2.      Psychotherapy recommendations: We will continue therapy at future visits.     3.     Return to clinic:  Weeks

## 2023-08-24 ENCOUNTER — OFFICE VISIT (OUTPATIENT)
Dept: BEHAVIORAL HEALTH | Facility: CLINIC | Age: 38
End: 2023-08-24
Payer: COMMERCIAL

## 2023-08-24 VITALS
SYSTOLIC BLOOD PRESSURE: 137 MMHG | WEIGHT: 158.6 LBS | HEART RATE: 64 BPM | HEIGHT: 73 IN | BODY MASS INDEX: 21.02 KG/M2 | DIASTOLIC BLOOD PRESSURE: 67 MMHG

## 2023-08-24 DIAGNOSIS — F33.1 MODERATE EPISODE OF RECURRENT MAJOR DEPRESSIVE DISORDER: Primary | ICD-10-CM

## 2023-08-24 DIAGNOSIS — F51.01 PRIMARY INSOMNIA: ICD-10-CM

## 2023-08-24 DIAGNOSIS — F41.1 GENERALIZED ANXIETY DISORDER: ICD-10-CM
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
## 2023-08-30 NOTE — PATIENT INSTRUCTIONS
1.  Please return to clinic at your next scheduled visit.  Please contact the clinic ([phone]) at least 24 hours prior in the event you need to cancel.  2.  Do no harm to yourself or others.    3.  Avoid alcohol and drugs.    4.  Take all medications as prescribed.  Please contact the clinic with any concerns. If you are in need of medication refills, please call the clinic at [phone].    5. Should you want to get in touch with your provider, ARMAND Loredo, please contact the office ([phone]), and staff will be able to page Op[facility] directly.  6. In the event you have personal crisis, contact the following crisis numbers: Suicide Prevention Hotline 1-838.159.7899; [facility] Helpline 9-061-798-SIFZ; [facility] Emergency Room [phone]; text HELLO to 537165; or 718.     SPECIFIC RECOMMENDATIONS:     1.      Medications discussed at this encounter:     No orders of the defined types were placed in this encounter.                      2.      Psychotherapy recommendations: We will continue therapy at future visits.     3.     Return to clinic: Return in about 2 weeks (around 9/7/2023) for Next scheduled follow up, Video visit.

## 2023-09-07 ENCOUNTER — OFFICE VISIT (OUTPATIENT)
Dept: BEHAVIORAL HEALTH | Facility: CLINIC | Age: 38
End: 2023-09-07
Payer: COMMERCIAL

## 2023-09-07 VITALS
HEART RATE: 60 BPM | DIASTOLIC BLOOD PRESSURE: 75 MMHG | WEIGHT: 163.8 LBS | HEIGHT: 73 IN | BODY MASS INDEX: 21.71 KG/M2 | SYSTOLIC BLOOD PRESSURE: 127 MMHG

## 2023-09-07 DIAGNOSIS — F41.1 GENERALIZED ANXIETY DISORDER: ICD-10-CM

## 2023-09-07 DIAGNOSIS — F51.01 PRIMARY INSOMNIA: ICD-10-CM

## 2023-09-07 DIAGNOSIS — F33.1 MODERATE EPISODE OF RECURRENT MAJOR DEPRESSIVE DISORDER: Primary | ICD-10-CM

## 2023-09-07 NOTE — PROGRESS NOTES
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 text in this note has been reviewed and is accurate as of 09/18/23

## 2023-09-19 NOTE — PATIENT INSTRUCTIONS
1.  Please return to clinic at your next scheduled visit.  Please contact the clinic ([phone]) at least 24 hours prior in the event you need to cancel.  2.  Do no harm to yourself or others.    3.  Avoid alcohol and drugs.    4.  Take all medications as prescribed.  Please contact the clinic with any concerns. If you are in need of medication refills, please call the clinic at [phone].    5. Should you want to get in touch with your provider, ARMAND Loredo, please contact the office ([phone]), and staff will be able to page Op[facility] directly.  6. In the event you have personal crisis, contact the following crisis numbers: Suicide Prevention Hotline 1-447.420.4946; [facility] Helpline 4-165-238-YUGJ; [facility] Emergency Room [phone]; text HELLO to 567999; or 635.     SPECIFIC RECOMMENDATIONS:     1.      Medications discussed at this encounter:     No orders of the defined types were placed in this encounter.                      2.      Psychotherapy recommendations: We will continue therapy at future visits.     3.     Return to clinic: Return in about 2 weeks (around 9/21/2023) for Next scheduled follow up.

## 2023-09-21 ENCOUNTER — OFFICE VISIT (OUTPATIENT)
Dept: BEHAVIORAL HEALTH | Facility: CLINIC | Age: 38
End: 2023-09-21
Payer: COMMERCIAL

## 2023-09-21 VITALS
SYSTOLIC BLOOD PRESSURE: 137 MMHG | HEIGHT: 73 IN | OXYGEN SATURATION: 97 % | WEIGHT: 162.6 LBS | HEART RATE: 71 BPM | BODY MASS INDEX: 21.55 KG/M2 | DIASTOLIC BLOOD PRESSURE: 80 MMHG

## 2023-09-21 DIAGNOSIS — F51.01 PRIMARY INSOMNIA: ICD-10-CM

## 2023-09-21 DIAGNOSIS — F41.1 GENERALIZED ANXIETY DISORDER: ICD-10-CM

## 2023-09-21 DIAGNOSIS — F33.1 MODERATE EPISODE OF RECURRENT MAJOR DEPRESSIVE DISORDER: Primary | ICD-10-CM

## 2023-09-21 NOTE — PROGRESS NOTES
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 guns/weapons, no present SI, no history of suicide attempts or self-harm in the past, minimal AODA, healthcare seeking, future orientation, willingness to engage in care.    Risk assessment could be further elevated in the event of treatment noncompliance and/or AODA.  Labs/studies: No labs/studies ordered at this time  Medications: No orders of the defined types were placed in this encounter.     Medication Education:   VIIBRYD (VILAZODONE) Start Viibryd 10 mg by mouth daily in the morning with food for 7 days, then 20 mg by mouth daily in the morning with food to target depressed mood and anxiety. Risks, benefits, alternatives discussed with patient including diarrhea, nausea, vomiting, dry mouth and insomnia. After discussion of these risks and benefits, the patient voiced understanding and agreed to proceed.    WELLBUTRIN XL (BUPROPION) Risks, benefits, alternatives discussed with patient including nausea, GI upset, increased energy, exacerbation of irritability, insomnia, lowering of seizure threshold.  After discussion of these risks and benefits, the patient voiced understanding and agreed to proceed.  LUNESTA (ESZOPICLONE) Risks, benefits, alternatives discussed with patient including sedation, dizziness, falls risk, GI upset, amnesia, grogginess the following day.  After discussion of these risks and benefits, the patient voiced understanding and agreed to  proceed.    Follow-up: Return in about 3 weeks (around 10/12/2023) for Next scheduled follow up.         This document has been electronically signed by ARMAND Loredo  September 23, 2023 15:44 EDT    Please note that portions of this note were completed with a voice recognition program.  Copied text in this note has been reviewed and is accurate as of 09/23/23

## 2023-09-23 NOTE — PATIENT INSTRUCTIONS
1.  Please return to clinic at your next scheduled visit.  Please contact the clinic ([phone]) at least 24 hours prior in the event you need to cancel.  2.  Do no harm to yourself or others.    3.  Avoid alcohol and drugs.    4.  Take all medications as prescribed.  Please contact the clinic with any concerns. If you are in need of medication refills, please call the clinic at [phone].    5. Should you want to get in touch with your provider, ARMAND Loredo, please contact the office ([phone]), and staff will be able to page [facility] directly.  6. In the event you have personal crisis, contact the following crisis numbers: Suicide Prevention Hotline 1-881.263.3942; [facility] Helpline 0-195-502-WYAP; [facility] Emergency Room [phone]; text HELLO to 905068; or 153.     SPECIFIC RECOMMENDATIONS:     1.      Medications discussed at this encounter:     No orders of the defined types were placed in this encounter.                      2.      Psychotherapy recommendations: We will continue therapy at future visits.     3.     Return to clinic: Return in about 3 weeks (around 10/12/2023) for Next scheduled follow up.

## 2023-10-19 NOTE — PROGRESS NOTES
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 concerns.    Continue psychotherapeutic modalities as indicated.    TREATMENT PLAN/GOALS:  Treatment plan: Continue supportive psychotherapy efforts and medications as indicated. Continue to challenge patterns of living conducive to pathology, strengthen defenses, promote problems solving, restore adaptive functioning and provide symptom relief. Treatment and medication options discussed during today's visit. Patient acknowledged and verbally consented to continue with current treatment plan and was educated on the importance of compliance with treatment and follow-up appointments.  Functional status:Good  Prognosis: Good  Progress: Continued improvement    Safety: No acute safety concerns.   Psychotherapy:      40 minutes of supportive psychotherapy with goal to strengthen defenses, promote problems solving, restore adaptive functioning and provide symptom relief. The therapeutic alliance was strengthened to encourage the patient to express their thoughts and feelings. Esteem building was enhanced through praise, reassurance, normalizing and encouragement. Coping skills were enhanced to build distress tolerance skills and emotional regulation. Allowed patient to freely discuss issues without interruption or judgement with unconditional  positive regard, active listening skills, and empathy. Provided a safe, confidential environment to facilitate the development of a positive therapeutic relationship and encourage open, honest communication. Assisted patient in identifying risk factors which would indicate the need for higher level of care including thoughts to harm self or others and/or self-harming behavior and encouraged patient to contact this office, call 911, or present to the nearest emergency room should any of these events occur. Assisted patient in processing session content; acknowledged and normalized patient’s thoughts, feelings, and concerns by utilizing a person-centered approach in efforts to build appropriate rapport and a positive therapeutic relationship with open and honest communication. Patient given education on medication side effects, diagnosis/illness and relapse symptoms. Plan to continue supportive psychotherapy in next appointment to provide symptom relief.      Risk Assessment: Risk of self-harm acutely and chronically is moderate.    Risk factors include anxiety disorder, mood disorder, and recent psychosocial stressors.   Protective factors include no family history, denies access to guns/weapons, no present SI, no history of suicide attempts or self-harm in the past, minimal AODA, healthcare seeking, future orientation, willingness to engage in care.    Risk assessment could be further elevated in the event of treatment noncompliance and/or AODA.  Labs/studies: No labs/studies ordered at this time  Medications:   New Medications Ordered This Visit   Medications    buPROPion XL (Wellbutrin XL) 150 MG 24 hr tablet     Sig: Take 1 tablet by mouth Every Morning.     Dispense:  30 tablet     Refill:  1    vilazodone (Viibryd) 40 MG tablet tablet     Sig: Take 1 tablet by mouth Daily.     Dispense:  30 tablet     Refill:  1      Medication Education:   VIIBRYD (VILAZODONE) Start Viibryd 10 mg by mouth daily in the morning  with food for 7 days, then 20 mg by mouth daily in the morning with food to target depressed mood and anxiety. Risks, benefits, alternatives discussed with patient including diarrhea, nausea, vomiting, dry mouth and insomnia. After discussion of these risks and benefits, the patient voiced understanding and agreed to proceed.    WELLBUTRIN XL (BUPROPION) Risks, benefits, alternatives discussed with patient including nausea, GI upset, increased energy, exacerbation of irritability, insomnia, lowering of seizure threshold.  After discussion of these risks and benefits, the patient voiced understanding and agreed to proceed.  LUNESTA (ESZOPICLONE) Risks, benefits, alternatives discussed with patient including sedation, dizziness, falls risk, GI upset, amnesia, grogginess the following day.  After discussion of these risks and benefits, the patient voiced understanding and agreed to proceed.      Follow-up: Return in about 3 weeks (around 11/10/2023) for Next scheduled follow up.         This document has been electronically signed by ARMAND Loredo  October 20, 2023 21:49 EDT    Please note that portions of this note were completed with a voice recognition program.  Copied text in this note has been reviewed and is accurate as of 10/20/23

## 2023-10-20 ENCOUNTER — CLINICAL SUPPORT (OUTPATIENT)
Dept: FAMILY MEDICINE CLINIC | Facility: CLINIC | Age: 38
End: 2023-10-20
Payer: COMMERCIAL

## 2023-10-20 ENCOUNTER — OFFICE VISIT (OUTPATIENT)
Dept: BEHAVIORAL HEALTH | Facility: CLINIC | Age: 38
End: 2023-10-20
Payer: COMMERCIAL

## 2023-10-20 ENCOUNTER — TELEPHONE (OUTPATIENT)
Dept: BEHAVIORAL HEALTH | Facility: CLINIC | Age: 38
End: 2023-10-20

## 2023-10-20 VITALS
BODY MASS INDEX: 22.03 KG/M2 | HEART RATE: 63 BPM | WEIGHT: 166.2 LBS | DIASTOLIC BLOOD PRESSURE: 74 MMHG | HEIGHT: 73 IN | OXYGEN SATURATION: 98 % | SYSTOLIC BLOOD PRESSURE: 126 MMHG

## 2023-10-20 DIAGNOSIS — F33.1 MODERATE EPISODE OF RECURRENT MAJOR DEPRESSIVE DISORDER: Primary | ICD-10-CM

## 2023-10-20 DIAGNOSIS — F51.01 PRIMARY INSOMNIA: ICD-10-CM

## 2023-10-20 DIAGNOSIS — F41.1 GENERALIZED ANXIETY DISORDER: ICD-10-CM

## 2023-10-20 DIAGNOSIS — Z79.899 MEDICATION MANAGEMENT: ICD-10-CM

## 2023-10-20 DIAGNOSIS — Z79.899 MEDICATION MANAGEMENT: Primary | ICD-10-CM

## 2023-10-20 RX ORDER — VILAZODONE HYDROCHLORIDE 40 MG/1
40 TABLET ORAL DAILY
Qty: 30 TABLET | Refills: 1 | Status: SHIPPED | OUTPATIENT
Start: 2023-10-20

## 2023-10-20 RX ORDER — BUPROPION HYDROCHLORIDE 150 MG/1
150 TABLET ORAL EVERY MORNING
Qty: 30 TABLET | Refills: 1 | Status: SHIPPED | OUTPATIENT
Start: 2023-10-20 | End: 2024-10-19

## 2023-10-20 NOTE — PATIENT INSTRUCTIONS
1.  Please return to clinic at your next scheduled visit.  Please contact the clinic ([phone]) at least 24 hours prior in the event you need to cancel.  2.  Do no harm to yourself or others.    3.  Avoid alcohol and drugs.    4.  Take all medications as prescribed.  Please contact the clinic with any concerns. If you are in need of medication refills, please call the clinic at [phone].    5. Should you want to get in touch with your provider, ARMAND Loredo, please contact the office ([phone]), and staff will be able to page Opal directly.  6. In the event you have personal crisis, contact the following crisis numbers: Suicide Prevention Hotline 1-462.557.3519; [facility] Helpline 2-964-369-QYSJ; [facility] Emergency Room [phone]; text HELLO to 354836; or 892.     SPECIFIC RECOMMENDATIONS:     1.      Medications discussed at this encounter:     New Medications Ordered This Visit   Medications    buPROPion XL (Wellbutrin XL) 150 MG 24 hr tablet     Sig: Take 1 tablet by mouth Every Morning.     Dispense:  30 tablet     Refill:  1    vilazodone (Viibryd) 40 MG tablet tablet     Sig: Take 1 tablet by mouth Daily.     Dispense:  30 tablet     Refill:  1                       2.      Psychotherapy recommendations: We will continue therapy at future visits.     3.     Return to clinic: No follow-ups on file.

## 2023-11-08 DIAGNOSIS — F51.01 PRIMARY INSOMNIA: ICD-10-CM

## 2023-11-08 NOTE — TELEPHONE ENCOUNTER
eszopiclone (LUNESTA) 2 MG tablet (08/01/2023)     Pt has upcoming appt  Appointment with Opal Huang APRN (11/10/2023)     Medication order pended.

## 2023-11-09 RX ORDER — ESZOPICLONE 2 MG/1
TABLET, FILM COATED ORAL
Qty: 30 TABLET | Refills: 2 | Status: SHIPPED | OUTPATIENT
Start: 2023-11-17

## 2023-11-09 NOTE — PROGRESS NOTES
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 facility-administered medications for this visit.       Problem List:  Patient Active Problem List   Diagnosis    Abdominal pain of unknown etiology    Contusion of left hand    Flu-like symptoms    Foreign body    Headache    Sprain of finger, left       Allergy:   No Known Allergies     Discontinued Medications:  There are no discontinued medications.      PLAN:   Presentation seems most consistent with DSM-V criteria for:  Diagnoses and all orders for this visit:    1. Moderate episode of recurrent major depressive disorder (Primary)    2. Generalized anxiety disorder    3. Primary insomnia       Continue Viibryd 40 mg daily  Continue Lunesta 2 mg at bedtime  Continue bupropion  mg daily  Follow up 3 weeks  Discussed medication options and treatment plan of prescribed medication as well as the risks, benefits, and side effects.  Patient verbalized understanding and is agreeable to this plan.   Patient is agreeable to call the office with any worsening of symptoms or onset of side effects.   Patient is agreeable to call 911 or go to the nearest ER should he/she begin having SI/HI.   Addressed all questions and concerns.    Continue psychotherapeutic modalities as indicated.    TREATMENT PLAN/GOALS:  Treatment plan: Continue  supportive psychotherapy efforts and medications as indicated. Continue to challenge patterns of living conducive to pathology, strengthen defenses, promote problems solving, restore adaptive functioning and provide symptom relief. Treatment and medication options discussed during today's visit. Patient acknowledged and verbally consented to continue with current treatment plan and was educated on the importance of compliance with treatment and follow-up appointments.  Functional status:Good  Prognosis: Good  Progress: Continued improvement    Safety: No acute safety concerns.   Psychotherapy:      45 minutes of supportive psychotherapy with goal to strengthen defenses, promote problems solving, restore adaptive functioning and provide symptom relief. The therapeutic alliance was strengthened to encourage the patient to express their thoughts and feelings. Esteem building was enhanced through praise, reassurance, normalizing and encouragement. Coping skills were enhanced to build distress tolerance skills and emotional regulation. Allowed patient to freely discuss issues without interruption or judgement with unconditional positive regard, active listening skills, and empathy. Provided a safe, confidential environment to facilitate the development of a positive therapeutic relationship and encourage open, honest communication. Assisted patient in identifying risk factors which would indicate the need for higher level of care including thoughts to harm self or others and/or self-harming behavior and encouraged patient to contact this office, call 911, or present to the nearest emergency room should any of these events occur. Assisted patient in processing session content; acknowledged and normalized patient’s thoughts, feelings, and concerns by utilizing a person-centered approach in efforts to build appropriate rapport and a positive therapeutic relationship with open and honest communication. Patient given  education on medication side effects, diagnosis/illness and relapse symptoms. Plan to continue supportive psychotherapy in next appointment to provide symptom relief.      Risk Assessment: Risk of self-harm acutely and chronically is moderate.    Risk factors include anxiety disorder, mood disorder, and recent psychosocial stressors.   Protective factors include no family history, denies access to guns/weapons, no present SI, no history of suicide attempts or self-harm in the past, minimal AODA, healthcare seeking, future orientation, willingness to engage in care.    Risk assessment could be further elevated in the event of treatment noncompliance and/or AODA.  Labs/studies: No labs/studies ordered at this time  Medications: No orders of the defined types were placed in this encounter.     Medication Education:   VIIBRYD (VILAZODONE) Start Viibryd 10 mg by mouth daily in the morning with food for 7 days, then 20 mg by mouth daily in the morning with food to target depressed mood and anxiety. Risks, benefits, alternatives discussed with patient including diarrhea, nausea, vomiting, dry mouth and insomnia. After discussion of these risks and benefits, the patient voiced understanding and agreed to proceed.    WELLBUTRIN XL (BUPROPION) Risks, benefits, alternatives discussed with patient including nausea, GI upset, increased energy, exacerbation of irritability, insomnia, lowering of seizure threshold.  After discussion of these risks and benefits, the patient voiced understanding and agreed to proceed.  LUNESTA (ESZOPICLONE) Risks, benefits, alternatives discussed with patient including sedation, dizziness, falls risk, GI upset, amnesia, grogginess the following day.  After discussion of these risks and benefits, the patient voiced understanding and agreed to proceed.    ELENA reviewed as expected.  Follow-up: Return in about 3 weeks (around 12/1/2023) for Next scheduled follow up.         This document has been  electronically signed by ARMAND Loredo  November 15, 2023 19:23 EST    Please note that portions of this note were completed with a voice recognition program.  Copied text in this note has been reviewed and is accurate as of 11/15/23

## 2023-11-10 ENCOUNTER — OFFICE VISIT (OUTPATIENT)
Dept: BEHAVIORAL HEALTH | Facility: CLINIC | Age: 38
End: 2023-11-10

## 2023-11-10 VITALS
WEIGHT: 165.8 LBS | HEART RATE: 72 BPM | SYSTOLIC BLOOD PRESSURE: 137 MMHG | HEIGHT: 73 IN | BODY MASS INDEX: 21.98 KG/M2 | OXYGEN SATURATION: 100 % | DIASTOLIC BLOOD PRESSURE: 79 MMHG

## 2023-11-10 DIAGNOSIS — F33.1 MODERATE EPISODE OF RECURRENT MAJOR DEPRESSIVE DISORDER: Primary | ICD-10-CM

## 2023-11-10 DIAGNOSIS — F51.01 PRIMARY INSOMNIA: ICD-10-CM

## 2023-11-10 DIAGNOSIS — F41.1 GENERALIZED ANXIETY DISORDER: ICD-10-CM

## 2023-11-16 NOTE — PATIENT INSTRUCTIONS
1.  Please return to clinic at your next scheduled visit.  Please contact the clinic ([phone]) at least 24 hours prior in the event you need to cancel.  2.  Do no harm to yourself or others.    3.  Avoid alcohol and drugs.    4.  Take all medications as prescribed.  Please contact the clinic with any concerns. If you are in need of medication refills, please call the clinic at [phone].    5. Should you want to get in touch with your provider, ARMAND Loredo, please contact the office ([phone]), and staff will be able to page [facility] directly.  6. In the event you have personal crisis, contact the following crisis numbers: Suicide Prevention Hotline 1-715.845.9826; [facility] Helpline 6-217-631-RPOE; [facility] Emergency Room [phone]; text HELLO to 613715; or 512.     SPECIFIC RECOMMENDATIONS:     1.      Medications discussed at this encounter:     No orders of the defined types were placed in this encounter.                      2.      Psychotherapy recommendations: We will continue therapy at future visits.     3.     Return to clinic: Return in about 3 weeks (around 12/1/2023) for Next scheduled follow up.

## 2023-12-07 ENCOUNTER — OFFICE VISIT (OUTPATIENT)
Dept: BEHAVIORAL HEALTH | Facility: CLINIC | Age: 38
End: 2023-12-07
Payer: COMMERCIAL

## 2023-12-07 VITALS
BODY MASS INDEX: 22.66 KG/M2 | WEIGHT: 171 LBS | HEIGHT: 73 IN | OXYGEN SATURATION: 98 % | SYSTOLIC BLOOD PRESSURE: 129 MMHG | HEART RATE: 71 BPM | DIASTOLIC BLOOD PRESSURE: 82 MMHG

## 2023-12-07 DIAGNOSIS — F41.1 GENERALIZED ANXIETY DISORDER: ICD-10-CM

## 2023-12-07 DIAGNOSIS — F51.01 PRIMARY INSOMNIA: ICD-10-CM

## 2023-12-07 DIAGNOSIS — F33.1 MODERATE EPISODE OF RECURRENT MAJOR DEPRESSIVE DISORDER: Primary | ICD-10-CM

## 2023-12-07 NOTE — PROGRESS NOTES
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  Reliability:  Good  Impulse Control:  Good     PHQ-9 Depression Screening  PHQ-9 Total Score:      Little interest or pleasure in doing things?     Feeling down, depressed, or hopeless?     Trouble falling or staying asleep, or sleeping too much?     Feeling tired or having little energy?     Poor appetite or overeating?     Feeling bad about yourself - or that you are a failure or have let yourself or your family down?     Trouble concentrating on things, such as reading the newspaper or watching television?     Moving or speaking so slowly that other people could have noticed? Or the opposite - being so fidgety or restless that you have been moving around a lot more than usual?     Thoughts that you would be better off dead, or of hurting yourself in some way?     PHQ-9 Total Score       MARGE-7     Review of systems is negative except as noted in HPI.  Labs:  No results found for: \"WBC\", \"PLT\", \"HGB\", \"HCT\", \"GLUCOSE\", \"CREATININE\", \"ALT\", \"AST\", \"BUN\", \"EGFR\", \"CHOL\", \"TRIG\", \"HDL\", \"LDL\", \"VLDL\", \"LDLHDL\", \"HGBA1C\", \"TSH\", \"FREET4\"   Pain Management Panel  More data may exist         Latest Ref Rng & Units 10/20/2023 5/19/2023   Pain Management Panel   Amphetamine, Urine Qual Negative Negative  " Negative    Barbiturates Screen, Urine Negative Negative  Negative    Benzodiazepine Screen, Urine Negative Negative  Negative    Cocaine Screen, Urine Negative Negative  Negative    Methadone Screen , Urine Negative Negative  Negative       Imaging Results:  No Images in the past 120 days found..  Current Medications:   Current Outpatient Medications   Medication Sig Dispense Refill    buPROPion XL (Wellbutrin XL) 150 MG 24 hr tablet Take 1 tablet by mouth Every Morning. 30 tablet 1    eszopiclone (LUNESTA) 2 MG tablet TAKE ONE TABLET BY MOUTH EACH NIGHT AT BEDTIME as needed FOR SLEEP - take immediately BEFORE bedtime 30 tablet 2    vilazodone (Viibryd) 40 MG tablet tablet Take 1 tablet by mouth Daily. 30 tablet 1     No current facility-administered medications for this visit.     Problem List:  Patient Active Problem List   Diagnosis    Abdominal pain of unknown etiology    Contusion of left hand    Flu-like symptoms    Foreign body    Headache    Sprain of finger, left     Allergy:   No Known Allergies   Discontinued Medications:  There are no discontinued medications.      PLAN:   Presentation seems most consistent with DSM-V criteria for:  Diagnoses and all orders for this visit:    1. Moderate episode of recurrent major depressive disorder (Primary)    2. Generalized anxiety disorder    3. Primary insomnia       Continue Viibryd 40 mg daily  Continue Lunesta 2 mg at bedtime  Continue bupropion  mg daily  Follow up 3 weeks  Medication Education:   VIIBRYD (VILAZODONE) Start Viibryd 10 mg by mouth daily in the morning with food for 7 days, then 20 mg by mouth daily in the morning with food to target depressed mood and anxiety. Risks, benefits, alternatives discussed with patient including diarrhea, nausea, vomiting, dry mouth and insomnia. After discussion of these risks and benefits, the patient voiced understanding and agreed to proceed.    WELLBUTRIN XL (BUPROPION) Risks, benefits, alternatives  discussed with patient including nausea, GI upset, increased energy, exacerbation of irritability, insomnia, lowering of seizure threshold.  After discussion of these risks and benefits, the patient voiced understanding and agreed to proceed.  LUNESTA (ESZOPICLONE) Risks, benefits, alternatives discussed with patient including sedation, dizziness, falls risk, GI upset, amnesia, grogginess the following day.  After discussion of these risks and benefits, the patient voiced understanding and agreed to proceed.    Medications: No orders of the defined types were placed in this encounter.     ELENA reviewed.   Discussed medication options and treatment plan of prescribed medication as well as the risks, benefits, and side effects.  Patient verbalized understanding and is agreeable to this plan.   Patient is agreeable to call the office with any worsening of symptoms or onset of side effects.   Patient is agreeable to call 911 or go to the nearest ER should he/she begin having SI/HI.   Continue psychotherapeutic modalities as indicated.  Continue to challenge patterns of living conducive to pathology, strengthen defenses, promote problems solving, restore adaptive functioning and provide symptom relief.   Patient acknowledged and verbally consented to continue with current treatment plan and was educated on the importance of compliance with treatment and follow-up appointments.  Addressed all questions and concerns.     Psychotherapy:      45 minutes of supportive psychotherapy with goal to strengthen defenses, promote problems solving, restore adaptive functioning and provide symptom relief. The therapeutic alliance was strengthened to encourage the patient to express their thoughts and feelings. Esteem building was enhanced through praise, reassurance, normalizing and encouragement. Coping skills were enhanced to build distress tolerance skills and emotional regulation. Allowed patient to freely discuss issues without  interruption or judgement with unconditional positive regard, active listening skills, and empathy. Provided a safe, confidential environment to facilitate the development of a positive therapeutic relationship and encourage open, honest communication. Assisted patient in identifying risk factors which would indicate the need for higher level of care including thoughts to harm self or others and/or self-harming behavior and encouraged patient to contact this office, call 911, or present to the nearest emergency room should any of these events occur. Assisted patient in processing session content; acknowledged and normalized patient’s thoughts, feelings, and concerns by utilizing a person-centered approach in efforts to build appropriate rapport and a positive therapeutic relationship with open and honest communication. Patient given education on medication side effects, diagnosis/illness and relapse symptoms. Plan to continue supportive psychotherapy in next appointment to provide symptom relief.      Functional status:Good  Prognosis: Good  Progress: Continued improvement    Safety/Risk Assessment: Risk of self-harm acutely and chronically is moderate.    Risk factors include anxiety disorder, mood disorder, and recent psychosocial stressors.   Protective factors include no family history, denies access to guns/weapons, no present SI, no history of suicide attempts or self-harm in the past, minimal AODA, healthcare seeking, future orientation, willingness to engage in care.    Risk assessment could be further elevated in the event of treatment noncompliance and/or AODA.    Follow-up: Return in about 1 month (around 1/7/2024).         This document has been electronically signed by ARMAND Loredo  December 18, 2023 14:27 EST    Please note that portions of this note were completed with a voice recognition program.  Copied text in this note has been reviewed and is accurate as of 12/18/23

## 2023-12-18 NOTE — PATIENT INSTRUCTIONS
1.  Please return to clinic at your next scheduled visit.  Please contact the clinic ([phone]) at least 24 hours prior in the event you need to cancel.  2.  Do no harm to yourself or others.    3.  Avoid alcohol and drugs.    4.  Take all medications as prescribed.  Please contact the clinic with any concerns. If you are in need of medication refills, please call the clinic at [phone].    5. Should you want to get in touch with your provider, ARMAND Loredo, please contact the office ([phone]), and staff will be able to page Op[facility] directly.  6. In the event you have personal crisis, contact the following crisis numbers: Suicide Prevention Hotline 1-685.218.6484; [facility] Helpline 5-713-916-CCDO; [facility] Room [phone]; text HELLO to 417075; or 420.     SPECIFIC RECOMMENDATIONS:     1.      Medications discussed at this encounter:     No orders of the defined types were placed in this encounter.                      2.      Psychotherapy recommendations: We will continue therapy at future visits.     3.     Return to clinic: Return in about 1 month (around 1/7/2024).

## 2024-01-08 DIAGNOSIS — F33.1 MODERATE EPISODE OF RECURRENT MAJOR DEPRESSIVE DISORDER: ICD-10-CM

## 2024-01-08 DIAGNOSIS — F41.1 GENERALIZED ANXIETY DISORDER: ICD-10-CM

## 2024-01-08 NOTE — TELEPHONE ENCOUNTER
REFILL REQUEST FOR VIIBRYD 40 MG TABLETS AND WELLBUTRIN  MG TABLET.  buPROPion XL (Wellbutrin XL) 150 MG 24 hr tablet (10/20/2023)     vilazodone (Viibryd) 40 MG tablet tablet (10/20/2023)     FOLLOW UP APPT ON 01/11/2024.  PT LAST SEEN ON 12/07/2023.    ORDERS ARE PENDED.

## 2024-01-09 RX ORDER — VILAZODONE HYDROCHLORIDE 40 MG/1
40 TABLET ORAL DAILY
Qty: 30 TABLET | Refills: 1 | Status: SHIPPED | OUTPATIENT
Start: 2024-01-09

## 2024-01-09 RX ORDER — BUPROPION HYDROCHLORIDE 150 MG/1
150 TABLET ORAL EVERY MORNING
Qty: 30 TABLET | Refills: 1 | Status: SHIPPED | OUTPATIENT
Start: 2024-01-09 | End: 2025-01-08

## 2024-01-11 ENCOUNTER — OFFICE VISIT (OUTPATIENT)
Dept: BEHAVIORAL HEALTH | Facility: CLINIC | Age: 39
End: 2024-01-11
Payer: COMMERCIAL

## 2024-01-11 VITALS
SYSTOLIC BLOOD PRESSURE: 131 MMHG | HEIGHT: 73 IN | HEART RATE: 81 BPM | WEIGHT: 168 LBS | BODY MASS INDEX: 22.26 KG/M2 | DIASTOLIC BLOOD PRESSURE: 74 MMHG

## 2024-01-11 DIAGNOSIS — F51.01 PRIMARY INSOMNIA: ICD-10-CM

## 2024-01-11 DIAGNOSIS — F33.1 MODERATE EPISODE OF RECURRENT MAJOR DEPRESSIVE DISORDER: Primary | ICD-10-CM

## 2024-01-11 DIAGNOSIS — F41.1 GENERALIZED ANXIETY DISORDER: ICD-10-CM

## 2024-01-11 RX ORDER — LOTEPREDNOL ETABONATE 5 MG/ML
SUSPENSION/ DROPS OPHTHALMIC
COMMUNITY
Start: 2023-12-30

## 2024-01-11 NOTE — PATIENT INSTRUCTIONS
1.  Please return to clinic at your next scheduled visit.  Please contact the clinic ([phone]) at least 24 hours prior in the event you need to cancel.  2.  Do no harm to yourself or others.    3.  Avoid alcohol and drugs.    4.  Take all medications as prescribed.  Please contact the clinic with any concerns. If you are in need of medication refills, please call the clinic at [phone].    5. Should you want to get in touch with your provider, ARMAND Loredo, please contact the office ([phone]), and staff will be able to page Opal directly.  6. In the event you have personal crisis, contact the following crisis numbers: Suicide Prevention Hotline 1-122.613.9943; [facility] Helpline 3-926-284-NDDJ; [facility] Emergency Room [phone]; text HELLO to 047416; or 298.     SPECIFIC RECOMMENDATIONS:     1.      Medications discussed at this encounter:     No orders of the defined types were placed in this encounter.                      2.      Psychotherapy recommendations: We will continue therapy at future visits.     3.     Return to clinic: Return in about 1 month (around 2/11/2024) for Next scheduled follow up.

## 2024-01-11 NOTE — PROGRESS NOTES
"[facility] [place] [facility]  Medication Management Follow-up    Vital Signs:   /74   Pulse 81   Ht 185.4 cm (73\")   Wt 76.2 kg (168 lb)   BMI 22.16 kg/m²     Chief Complaint: Depression. Anxiety. Insomnia.     History of Present Illness:   Chet Cabrera is a 38 y.o. male who presents today for follow-up and medication management for:    ICD-10-CM ICD-9-CM   1. Moderate episode of recurrent major depressive disorder  F33.1 296.32   2. Generalized anxiety disorder  F41.1 300.02   3. Primary insomnia  F51.01 307.42       01/11/2024 Patient is taking medications as prescribed and is tolerating them well.   Wife has filed for dissolution of marriage, will continue to be a lengthy and difficult process until agreements have been completed. Despite this entire process, he seems to be coping and moving through this with healthy boundaries and coping/defense mechanisms.   He has been performing intense self-reflection and cognitive behaviors have greatly improved since our initial evaluation.   Depression, anxiety, and insomnia are well-controlled with current medication management.   Depression  Visit Type: follow-up (Depression, MARGE, Insomnia)  Patient presents with the following symptoms: excessive worry, feelings of hopelessness, muscle tension, nervousness/anxiety, psychomotor agitation and restlessness.  Patient is not experiencing: suicidal ideas, suicidal planning and thoughts of death.  Frequency of symptoms: occasionally  Severity: moderate  Sleep quality: good (Insomnia is well controlled with Lunesta)  Denies suicidal ideation.  Denies AVH.  We will continue to monitor for mood, behavior, and safety.    Record Review is below for 01/11/2024 :   POC Urine Drug Screen, Triage (05/19/2023 12:30)  No current or pertinent labs, imaging, procedures in record  EKG Results:  None in record  Head Imaging:  None in record    12/07/2023 Patient is taking medications as prescribed and is tolerating them " well.   He has made a more resolute decision to move forward with dissolution of marriage. This is going to be a long and difficult process related to estate and custody arrangements.   Despite this entire process, he seems to be coping and moving through this with healthy boundaries and coping/defense mechanisms.   Depression, anxiety, and insomnia are well-controlled with current medication management.   Depression  Visit Type: follow-up (Depression, MARGE, Insomnia)  Patient presents with the following symptoms: excessive worry, feelings of hopelessness, muscle tension, nervousness/anxiety, psychomotor agitation and restlessness.  Patient is not experiencing: suicidal ideas, suicidal planning and thoughts of death.  Frequency of symptoms: occasionally  Severity: moderate  Sleep quality: good (Insomnia is well controlled with Lunesta)  Denies suicidal ideation.  Denies AVH.  We will continue to monitor for mood, behavior, and safety.  Continue Viibryd 40 mg daily  Continue Lunesta 2 mg at bedtime  Continue bupropion  mg daily  Follow up 3 weeks    Record Review is below for 12/07/2023 :   POC Urine Drug Screen, Triage (05/19/2023 12:30)  No current or pertinent labs, imaging, procedures in record  EKG Results:  None in record  Head Imaging:  None in record    11/10/2023 Patient is taking medications as prescribed and is tolerating them well.   He and his wife both had individual sessions and more sessions with marriage therapist.   It appears he is moving more towards a decision of dissolving marriage. There have been some apparent and incongruent behaviors by his wife during this process that have caused him alarm.  There is remains some uncertainty about whether or not they are working on their marriage or rather moving towards divorce.  Despite this entire process, he seems to be coping and moving through this with healthy boundaries and coping/defense mechanisms.   Depression, anxiety, and insomnia are  well-controlled with current medication management.   Depression  Visit Type: follow-up (Depression, MARGE, Insomnia)  Patient presents with the following symptoms: excessive worry, feelings of hopelessness, muscle tension, nervousness/anxiety, psychomotor agitation and restlessness.  Patient is not experiencing: suicidal ideas, suicidal planning and thoughts of death.  Frequency of symptoms: occasionally  Severity: moderate  Sleep quality: good (Insomnia is well controlled with Lunesta)  Denies suicidal ideation.  Denies AVH.  We will continue to monitor for mood, behavior, and safety.  Continue Viibryd 40 mg daily  Continue Lunesta 2 mg at bedtime  Continue bupropion  mg daily  Follow up 3 weeks    Record Review is below for 11/10/2023 : I have thoroughly reviewed the patient's electronic medical record to include previous encounters, care everywhere, notes, medications, labs, ELENA and UDS (if applicable), imaging, and EKG's.  Pertinent information is included in this note.  POC Urine Drug Screen, Triage (05/19/2023 12:30)  No current or pertinent labs, imaging, procedures in record  EKG Results:  None in record  Head Imaging:  None in record      10/20/2023 Patient is taking medications as prescribed and is tolerating them well.   He and his wife both had individual sessions and a session with marriage therapist.   Things are moving along with therapy, however, there is still uncertainty about whether or not they are working on their marriage or rather moving towards divorce.  They continue to be .  She is living in her own apartment.  He expresses very logical and rational response to this situation, he appears to be coping very well despite this intense stressor.  Depression and anxiety are well controlled with current medications.  Depression  Visit Type: follow-up (Depression, MARGE, Insomnia)  Patient presents with the following symptoms: excessive worry, feelings of hopelessness, muscle  tension, nervousness/anxiety, psychomotor agitation and restlessness.  Patient is not experiencing: suicidal ideas, suicidal planning and thoughts of death.  Frequency of symptoms: occasionally   Severity: moderate   Sleep quality: good (Insomnia is well controlled with Lunesta)  Denies suicidal ideation.  Denies AVH.  We will continue to monitor for mood, behavior, and safety.  Continue Viibryd 40 mg daily  Continue Lunesta 2 mg at bedtime  Continue bupropion  mg daily  Follow up 3 weeks    Record Review is below for 10/20/2023 : I have thoroughly reviewed the patient's electronic medical record to include previous encounters, care everywhere, notes, medications, labs, ELENA and UDS (if applicable), imaging, and EKG's.  Pertinent information is included in this note.  POC Urine Drug Screen, Triage (05/19/2023 12:30)  No current or pertinent labs, imaging, procedures in record  EKG Results:  None in record  Head Imaging:  None in record      09/21/2023 Patient is taking medications as prescribed and is tolerating them well.   They had an initial evaluation with the marriage therapist.   He is hopeful by the fact that she has attended the sessions.  After discussion, it seems that there may be some resolution after few follow-up visits.  They are both going to have single sessions with the therapist before having another couples session.  Depression  Visit Type: follow-up (Depression, MARGE, Insomnia)  Patient presents with the following symptoms: excessive worry, feelings of hopelessness, muscle tension, nervousness/anxiety, psychomotor agitation and restlessness.  Patient is not experiencing: suicidal ideas, suicidal planning and thoughts of death.  Frequency of symptoms: occasionally   Severity: moderate   Sleep quality: good (Insomnia is well controlled with Lunesta)  Denies suicidal ideation.  Denies AVH.  We will continue to monitor for mood, behavior, and safety.  Continue Viibryd 40 mg daily  Continue  Lunesta 2 mg at bedtime  Continue bupropion  mg daily  Follow up 2 weeks    Record Review is below for 09/21/2023 : I have thoroughly reviewed the patient's electronic medical record to include previous encounters, care everywhere, notes, medications, labs, ELENA and UDS (if applicable), imaging, and EKG's.  Pertinent information is included in this note.  POC Urine Drug Screen, Triage (05/19/2023 12:30)  No current or pertinent labs, imaging, procedures in record  EKG Results:  None in record  Head Imaging:  None in record    09/07/2023 Patient is taking medications as prescribed and is tolerating them well.   He and his wife are still officially . She is residing in her own apartment and they are sharing custody of their daughter every four days. They have MarriageTherapy next week, couples. He says she has agreed to attending.  Depression  Visit Type: follow-up (Depression, MARGE, Insmonia)  Patient presents with the following symptoms: depressed mood, excessive worry, feelings of hopelessness, nervousness/anxiety and restlessness.  Patient is not experiencing: anhedonia, decreased concentration, fatigue, feelings of worthlessness, insomnia (Well controlled with Lunesta), suicidal ideas, suicidal planning and thoughts of death.  Frequency of symptoms: occasionally   Severity: moderate   Sleep quality: good  Compliance with medications:  %  Denies suicidal ideation.  Denies AVH.  We will continue to monitor for mood, behavior, and safety.  Continue Viibryd 40 mg daily  Continue Lunesta 2 mg at bedtime  Continue bupropion  mg daily  Follow up 2 weeks    Record Review is below for 09/07/2023 : I have thoroughly reviewed the patient's electronic medical record to include previous encounters, care everywhere, notes, medications, labs, ELENA and UDS (if applicable), imaging, and EKG's.  Pertinent information is included in this note.  POC Urine Drug Screen, Triage (05/19/2023 12:30)  No  current or pertinent labs, imaging, procedures in record  EKG Results:  None in record  Head Imaging:  None in record    08/24/2023 Patient is taking medications as prescribed and is tolerating them well.  Patient is wife are still planning to attend marriage counseling in September.  Patient's wife has moved out into her own apartment.  They have currently decided on an arrangement of custody for their daughter every 4 days transition.  There are still some issues with communication and trust.  We continue to discuss effective boundary setting while also communicating effectively.  Depression  Visit Type: follow-up (Depression, MARGE, Insomnia)  Patient presents with the following symptoms: excessive worry, feelings of hopelessness, memory impairment, muscle tension, nervousness/anxiety and restlessness.  Patient is not experiencing: anhedonia, decreased concentration, depressed mood, fatigue, insomnia, suicidal ideas, suicidal planning and thoughts of death.  Frequency of symptoms: most days   Severity: causing significant distress   Sleep quality: good (Insomnia is well-controlled with Lunesta)  Denies suicidal ideation.  Denies AVH.  We will continue to monitor for mood, behavior, and safety.  Continue Viibryd 40 mg daily  Continue Lunesta 2 mg at bedtime  Continue bupropion  mg daily  Follow up 2 weeks    Record Review is below for 08/24/2023 : I have thoroughly reviewed the patient's electronic medical record to include previous encounters, care everywhere, notes, medications, labs, ELENA and UDS (if applicable), imaging, and EKG's.  Pertinent information is included in this note.  POC Urine Drug Screen, Triage (05/19/2023 12:30)  No current or pertinent labs, imaging, procedures in record  EKG Results:  None in record  Head Imaging:  None in record      08/03/2023 Patient is taking medications as prescribed and is tolerating them well.   Going to initiate marriage counseling in September, she is still  moving out and will be in her own apartment.  He is asking about possibly discontinuing bupropion XL.  He does believe this medication has helped but he does not want to continue to take multiple medications.  We are in agreement that we will wait until some of these other stressors have been resolved.  Depression and anxiety symptoms are well controlled with Viibryd and bupropion XL.  Insomnia is well controlled with Lunesta.  He enjoys therapy with me and we are continuing to work on setting firm boundaries, improving communication techniques.    Denies suicidal ideation.  Denies AVH.  We will continue to monitor for mood, behavior, and safety.  Continue Viibryd 40 mg daily  Continue Lunesta 2 mg at bedtime  Continue bupropion  mg daily  Follow up 2 weeks    Record Review is below for 08/03/2023 : I have thoroughly reviewed the patient's electronic medical record to include previous encounters, care everywhere, notes, medications, labs, ELENA and UDS (if applicable), imaging, and EKG's.  Pertinent information is included in this note.  POC Urine Drug Screen, Triage (05/19/2023 12:30)  No current or pertinent labs, imaging, procedures in record  EKG Results:  None in record  Head Imaging:  None in record      07/20/2023 Patient is taking medications as prescribed and is tolerating them well.   Since our last encounter, patient and his wife are going to be further , she is planning to move out of their house and into her own apartment. This continues to be a situational stressor for him, he is ultimately concerned with how this is going to affect their youngest child.   He reports that depression and anxiety are well controlled with Viibryd and bupropion XL. Reports increased energy and improved mood.  Sleep: Insomnia is well controlled with Lunesta  He enjoys therapy with me and we are continuing to work on setting firm boundaries, improving communication techniques.  We again discussed my intense  recommendation that he and his wife begin marriage counseling/therapy.   Denies suicidal ideation.  Denies AVH.  We will continue to monitor for mood, behavior, and safety.  Continue Viibryd 40 mg daily  Continue Lunesta 2 mg at bedtime  Continue bupropion  mg daily  Follow up 2 weeks    Record Review is below for 07/20/2023 : I have thoroughly reviewed the patient's electronic medical record to include previous encounters, care everywhere, notes, medications, labs, ELENA and UDS (if applicable), imaging, and EKG's.  Pertinent information is included in this note.  POC Urine Drug Screen, Triage (05/19/2023 12:30)  No current or pertinent labs, imaging, procedures in record  EKG Results:  None in record  Head Imaging:  None in record    07/06/2023 Patient is taking medications as prescribed and is tolerating them well.   Patient continues to see some improvement in his relationship.  Continue to improve communication.  He reports that depression and anxiety are well controlled with Viibryd and bupropion XL.  Sleep: Insomnia is well controlled with Lunesta, he reports by sleep he has had in a long time.  He enjoys therapy with me and we are continuing to work on setting firm boundaries, improving communication techniques.  We again discussed my intense recommendation that he and his wife begin marriage counseling/therapy.   Denies suicidal ideation.  Denies AVH.  We will continue to monitor for mood, behavior, and safety.  Continue Viibryd 40 mg daily  Continue Lunesta 2 mg at bedtime  Continue bupropion  mg daily  Follow up 2 weeks    Record Review is below for 07/06/2023 : I have thoroughly reviewed the patient's electronic medical record to include previous encounters, care everywhere, notes, medications, labs, ELENA and UDS (if applicable), imaging, and EKG's.  Pertinent information is included in this note.  POC Urine Drug Screen, Triage (05/19/2023 12:30)  No current or pertinent labs, imaging,  procedures in record  EKG Results:  None in record    06/23/2023 Patient is taking medications as prescribed and is tolerating them well.   Patient states that he and his wife have had a good couple weeks and have improved in communication.  There is less tension.  They have been spending a little bit more time together.  Patient still reports improved mood, energy, and decreased anxiety.  Sleep: Insomnia is well controlled with Lunesta  Denies suicidal ideation.  Denies AVH.  We will continue to monitor for mood, behavior, and safety.  Continue Viibryd 40 mg daily  Continue Lunesta 2 mg at bedtime  Continue bupropion  mg daily  Follow up 2 weeks    Record Review is below for 06/23/2023 : I have thoroughly reviewed the patient's electronic medical record to include previous encounters, care everywhere, notes, medications, labs, ELENA and UDS (if applicable), imaging, and EKG's.  Pertinent information is included in this note.  POC Urine Drug Screen, Triage (05/19/2023 12:30)  No current or pertinent labs, imaging, procedures in record  EKG Results:  None in record    06/16/2023 Patient is taking medications as prescribed and is tolerating them well.   Feeling better since adding wellbutrin XL, improved mood and energy, decreased anxiety  Sleep: Insomnia is controlled with Lunesta  Having more conversations with his wife, working on communication. Discussing seeking marriage counseling.   Denies suicidal ideation.  Denies AVH.  We will continue to monitor for mood, behavior, and safety.  Continue Viibryd 40 mg daily  Continue Lunesta 2 mg at bedtime  Continue bupropion  mg daily  Follow up 2 weeks    Record Review is below for 06/16/2023 : I have thoroughly reviewed the patient's electronic medical record to include previous encounters, care everywhere, notes, medications, labs, ELENA and UDS (if applicable), imaging, and EKG's.  Pertinent information is included in this note.  No current or pertinent  labs, imaging, procedures in record  EKG Results:  None in record    06/09/2023 Patient is taking medications as prescribed and is tolerating them well. Patient reports that he has had some new developments in the situation with his marriage.  He is still ambivalent and contemplating future plans.  Patient states he is sleeping well on the increased dose of Lunesta.  His anxiety and depression symptoms are well controlled with Viibryd. He is describing some trouble with focus, concentration, and decreased energy levels. He is enjoying therapy with me.  We are still utilizing cognitive behavioral therapy to address his current situation. We are discussing referral for couples/marriage counseling as it would be more appropriate for them both to see a different therapist to avoid any potential conflict of interest. Denies suicidal ideation.  Denies AVH.  We will continue to monitor for mood, behavior, and safety.  Continue Viibryd 40 mg daily  Continue Lunesta 2 mg at bedtime  Start bupropion  mg daily  Follow up 2 weeks    Record Review is below for 06/09/2023 : I have thoroughly reviewed the patient's electronic medical record to include previous encounters, care everywhere, notes, medications, labs, ELENA and UDS (if applicable), imaging, and EKG's.  Pertinent information is included in this note.  No current or pertinent labs, imaging, procedures in record  EKG Results:  None in record    05/25/2023 Patient is taking medications as prescribed and is tolerating them well. Patient states his sleep has improved.  He also reports that he is feeling better than he has in a long time and believes the Viibryd is really helping with his depression and anxiety.  He still enjoys therapy with me.  He has had some conversations with his wife.  They are still not significant resolution, however they are communicating slightly better than before.  We discussed some of his progress with using new communication techniques.   He seems to be coping much better with his reaction and response to situations.  His mood has improved. Denies suicidal ideation.  Denies AVH.  We will continue to monitor for mood, behavior, and safety.  Continue Viibryd 40 mg daily  Continue Lunesta 2 mg at bedtime    Record Review is below for 05/25/2023 : I have thoroughly reviewed the patient's electronic medical record to include previous encounters, care everywhere, notes, medications, labs, ELENA and UDS (if applicable), imaging, and EKG's.  Pertinent information is included in this note.  No current or pertinent labs, imaging, procedures in record  EKG Results:  None in record    05/19/2023 Patient is taking medications as prescribed and is tolerating them well. Patient reports he is still having some sleep disturbance.  He says that some nights he is sleeping really well, and sometimes he is having difficulty with either falling asleep or staying asleep.  Patient states that Viibryd is helping with depression and anxiety.  He enjoys therapy with me.  He is still dealing with separation from his wife, however, they are still residing in the same house and sleeping in separate bedrooms.  They have had some interactions with each other.  Some of these discussions have been productive and some have been unproductive.  We discussed setting clear boundaries, expectations, and working on improvement of communication techniques. Denies suicidal ideation.  Denies AVH.  We will continue to monitor for mood, behavior, and safety.  Continue Viibryd 40 mg daily  Increase Lunesta to 2 mg at bedtime  UDS  CSA  Follow-up 1 week    Record Review is below for 05/19/2023 : I have thoroughly reviewed the patient's electronic medical record to include previous encounters, care everywhere, notes, medications, labs, ELENA and UDS (if applicable), imaging, and EKG's.  Pertinent information is included in this note.  No current or pertinent labs, imaging, procedures in  "record  EKG Results:  None in record    05/10/2023 Patient is taking medications as prescribed and is tolerating them well. Patient discontinued abilify related to negative side effects. Abilify made him have nightmares and feel like a \"zombie.\" Increase viibryd to 40mg.  Patient states that he does feel like the Viibryd is helping however he is wondering if we can consider an increase in dose to further target his depression and anxiety.  Lunesta is helping with insomnia.  He is still enjoying therapy with me.  We are discussing the issues of separation from his wife and his situational stressors. Denies suicidal ideation.  Denies AVH. We will continue to monitor for mood, behavior, and safety.  Increase Viibryd to 40 mg daily  Discontinue Abilify  Continue Lunesta 1 mg daily  Follow-up 1 week    Record Review is below for 05/10/2023 : I have thoroughly reviewed the patient's electronic medical record to include previous encounters, care everywhere, notes, medications, labs, ELENA and UDS (if applicable), imaging, and EKG's.  Pertinent information is included in this note.  No current or pertinent labs, imaging, procedures in record  EKG Results:  None in record    05/05/2023 Patient states he feels like he has had a chronic stress/anxiety situation.  He endorses depressed mood and anxiety which he has been dealing with for several years. Had an intrusive suicidal thought about hanging himself and that is what caused to seek treatment. He called PCP next morning. Was previously experiencing nightmares but since starting the lunesta he has not had nightmares and is sleeping well.  He is currently dealing with separation from his wife.  They are still contemplating potential divorce.  He is ambivalent about the situation, is having a difficult time with focus and concentration on making certain decisions related to significant anxiety.  He enjoys therapy with me.  Denies any current suicidal ideation. Denies AVH.  " PHQ-9 is 14 and MARGE-7 is 17 and both are congruent with assessment and presentation.  Continue Viibryd 20 mg daily  Continue Lunesta 1 mg at bedtime  Start Abilify 2 mg daily  Follow-up 1 week    Record Review for 2023 : I have thoroughly reviewed the patient's electronic medical record to include previous encounters, care everywhere, notes, medications, labs, ELENA and UDS (if applicable), imaging, and EKG's.  Pertinent information is included in this note.  No current or pertinent labs, imaging, procedures in record  EKG Results:  None in record    Per Referring Provider 2023 patient returns to office complaining of severe life stressors.  He has had issues in his marriages.  He has major stressors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

## 2024-02-21 DIAGNOSIS — F51.01 PRIMARY INSOMNIA: ICD-10-CM

## 2024-02-21 RX ORDER — ESZOPICLONE 2 MG/1
TABLET, FILM COATED ORAL
Qty: 30 TABLET | Refills: 2 | Status: SHIPPED | OUTPATIENT
Start: 2024-03-01

## 2024-02-21 NOTE — TELEPHONE ENCOUNTER
REFILL REQUEST     eszopiclone (LUNESTA) 2 MG tablet (11/17/2023)     LAST OFFICE VISIT-    Office Visit with Opal Huang APRN (01/11/2024)     NEXT FOLLOW UP WITH PROVIDER-    Appointment with Opal Huang APRN (02/22/2024)     ORDER PENDING.

## 2024-02-22 ENCOUNTER — OFFICE VISIT (OUTPATIENT)
Dept: BEHAVIORAL HEALTH | Facility: CLINIC | Age: 39
End: 2024-02-22
Payer: COMMERCIAL

## 2024-02-22 VITALS
WEIGHT: 179 LBS | BODY MASS INDEX: 23.72 KG/M2 | HEART RATE: 86 BPM | SYSTOLIC BLOOD PRESSURE: 138 MMHG | DIASTOLIC BLOOD PRESSURE: 80 MMHG | OXYGEN SATURATION: 96 % | HEIGHT: 73 IN

## 2024-02-22 DIAGNOSIS — F41.1 GENERALIZED ANXIETY DISORDER: ICD-10-CM

## 2024-02-22 DIAGNOSIS — F33.1 MODERATE EPISODE OF RECURRENT MAJOR DEPRESSIVE DISORDER: Primary | ICD-10-CM

## 2024-02-22 DIAGNOSIS — Z63.5 MARITAL DISRUPTION INVOLVING DIVORCE: ICD-10-CM

## 2024-02-22 DIAGNOSIS — F51.01 PRIMARY INSOMNIA: ICD-10-CM

## 2024-02-22 RX ORDER — VILAZODONE HYDROCHLORIDE 40 MG/1
40 TABLET ORAL DAILY
Qty: 30 TABLET | Refills: 1 | Status: SHIPPED | OUTPATIENT
Start: 2024-02-22

## 2024-02-22 SDOH — SOCIAL STABILITY - SOCIAL INSECURITY: DISRUPTION OF FAMILY BY SEPARATION AND DIVORCE: Z63.5

## 2024-02-22 NOTE — PROGRESS NOTES
"[facility] [facility]  Medication Management Follow-up    Vital Signs:   /80   Pulse 86   Ht 185.4 cm (73\")   Wt 81.2 kg (179 lb)   SpO2 96%   BMI 23.62 kg/m²     Chief Complaint: Anxiety.     History of Present Illness:   Chet Cabrera is a 39 y.o. male who presents today for follow-up and medication management for:    ICD-10-CM ICD-9-CM   1. Moderate episode of recurrent major depressive disorder  F33.1 296.32   2. Generalized anxiety disorder  F41.1 300.02   3. Primary insomnia  F51.01 307.42   4. Marital disruption involving divorce  Z63.5 V61.03       02/22/2024 Patient has discontinued wellbutrin, was wanting to decrease amount of medications.   Since our last encounter, wife has retracted filing for dissolution of marriage and is requesting reconciliation.   There has been, over the previous year, significant damage/harm done to their marriage contract. They have been experiencing irreconcilable differences in morality, trust, and infidelity. He is experiencing intense ambivalence currently regarding any reconciliation. This is causing increased anxiety and stress. We are working through the   potential outcomes of any decisions made in response to her request. He has exhibited tremendous growth and self-reflection throughout this process. However, he is yearning for guidance and resolution.   Despite his current situation, he is coping very well and symptoms of depression, anxiety, and insomnia are well-controlled with current medication management.   Depression  Visit Type: follow-up (Depression, MARGE, Insomnia)  Patient presents with the following symptoms: excessive worry, feelings of hopelessness, muscle tension, nervousness/anxiety, psychomotor agitation and restlessness.  Patient is not experiencing: suicidal ideas, suicidal planning and thoughts of death.  Frequency of symptoms: occasionally  Severity: moderate  Sleep quality: good (Insomnia is well controlled with " Lunesta)  Denies suicidal ideation.  Denies AVH.  We will continue to monitor for mood, behavior, and safety.    Record Review is below for 02/22/2024 :   POC Urine Drug Screen, Triage (05/19/2023 12:30)  No current or pertinent labs, imaging, procedures in record  EKG Results:  None in record  Head Imaging:  None in record      01/11/2024 Patient is taking medications as prescribed and is tolerating them well.   Wife has filed for dissolution of marriage, will continue to be a lengthy and difficult process until agreements have been completed. Despite this entire process, he seems to be coping and moving through this with healthy boundaries and coping/defense mechanisms.   He has been performing intense self-reflection and cognitive behaviors have greatly improved since our initial evaluation.   Depression, anxiety, and insomnia are well-controlled with current medication management.   Depression  Visit Type: follow-up (Depression, MARGE, Insomnia)  Patient presents with the following symptoms: excessive worry, feelings of hopelessness, muscle tension, nervousness/anxiety, psychomotor agitation and restlessness.  Patient is not experiencing: suicidal ideas, suicidal planning and thoughts of death.  Frequency of symptoms: occasionally  Severity: moderate  Sleep quality: good (Insomnia is well controlled with Lunesta)  Denies suicidal ideation.  Denies AVH.  We will continue to monitor for mood, behavior, and safety.  Continue Viibryd 40 mg daily  Continue Lunesta 2 mg at bedtime  Continue bupropion  mg daily  Follow up 3 weeks    Record Review is below for 01/11/2024 :   POC Urine Drug Screen, Triage (05/19/2023 12:30)  No current or pertinent labs, imaging, procedures in record  EKG Results:  None in record  Head Imaging:  None in record    12/07/2023 Patient is taking medications as prescribed and is tolerating them well.   He has made a more resolute decision to move forward with dissolution of marriage. This  is going to be a long and difficult process related to estate and custody arrangements.   Despite this entire process, he seems to be coping and moving through this with healthy boundaries and coping/defense mechanisms.   Depression, anxiety, and insomnia are well-controlled with current medication management.   Depression  Visit Type: follow-up (Depression, MARGE, Insomnia)  Patient presents with the following symptoms: excessive worry, feelings of hopelessness, muscle tension, nervousness/anxiety, psychomotor agitation and restlessness.  Patient is not experiencing: suicidal ideas, suicidal planning and thoughts of death.  Frequency of symptoms: occasionally  Severity: moderate  Sleep quality: good (Insomnia is well controlled with Lunesta)  Denies suicidal ideation.  Denies AVH.  We will continue to monitor for mood, behavior, and safety.  Continue Viibryd 40 mg daily  Continue Lunesta 2 mg at bedtime  Continue bupropion  mg daily  Follow up 3 weeks    Record Review is below for 12/07/2023 :   POC Urine Drug Screen, Triage (05/19/2023 12:30)  No current or pertinent labs, imaging, procedures in record  EKG Results:  None in record  Head Imaging:  None in record    11/10/2023 Patient is taking medications as prescribed and is tolerating them well.   He and his wife both had individual sessions and more sessions with marriage therapist.   It appears he is moving more towards a decision of dissolving marriage. There have been some apparent and incongruent behaviors by his wife during this process that have caused him alarm.  There is remains some uncertainty about whether or not they are working on their marriage or rather moving towards divorce.  Despite this entire process, he seems to be coping and moving through this with healthy boundaries and coping/defense mechanisms.   Depression, anxiety, and insomnia are well-controlled with current medication management.   Depression  Visit Type: follow-up  (Depression, MARGE, Insomnia)  Patient presents with the following symptoms: excessive worry, feelings of hopelessness, muscle tension, nervousness/anxiety, psychomotor agitation and restlessness.  Patient is not experiencing: suicidal ideas, suicidal planning and thoughts of death.  Frequency of symptoms: occasionally  Severity: moderate  Sleep quality: good (Insomnia is well controlled with Lunesta)  Denies suicidal ideation.  Denies AVH.  We will continue to monitor for mood, behavior, and safety.  Continue Viibryd 40 mg daily  Continue Lunesta 2 mg at bedtime  Continue bupropion  mg daily  Follow up 3 weeks    Record Review is below for 11/10/2023 : I have thoroughly reviewed the patient's electronic medical record to include previous encounters, care everywhere, notes, medications, labs, ELENA and UDS (if applicable), imaging, and EKG's.  Pertinent information is included in this note.  POC Urine Drug Screen, Triage (05/19/2023 12:30)  No current or pertinent labs, imaging, procedures in record  EKG Results:  None in record  Head Imaging:  None in record      10/20/2023 Patient is taking medications as prescribed and is tolerating them well.   He and his wife both had individual sessions and a session with marriage therapist.   Things are moving along with therapy, however, there is still uncertainty about whether or not they are working on their marriage or rather moving towards divorce.  They continue to be .  She is living in her own apartment.  He expresses very logical and rational response to this situation, he appears to be coping very well despite this intense stressor.  Depression and anxiety are well controlled with current medications.  Depression  Visit Type: follow-up (Depression, MARGE, Insomnia)  Patient presents with the following symptoms: excessive worry, feelings of hopelessness, muscle tension, nervousness/anxiety, psychomotor agitation and restlessness.  Patient is not  experiencing: suicidal ideas, suicidal planning and thoughts of death.  Frequency of symptoms: occasionally   Severity: moderate   Sleep quality: good (Insomnia is well controlled with Lunesta)  Denies suicidal ideation.  Denies AVH.  We will continue to monitor for mood, behavior, and safety.  Continue Viibryd 40 mg daily  Continue Lunesta 2 mg at bedtime  Continue bupropion  mg daily  Follow up 3 weeks    Record Review is below for 10/20/2023 : I have thoroughly reviewed the patient's electronic medical record to include previous encounters, care everywhere, notes, medications, labs, ELENA and UDS (if applicable), imaging, and EKG's.  Pertinent information is included in this note.  POC Urine Drug Screen, Triage (05/19/2023 12:30)  No current or pertinent labs, imaging, procedures in record  EKG Results:  None in record  Head Imaging:  None in record      09/21/2023 Patient is taking medications as prescribed and is tolerating them well.   They had an initial evaluation with the marriage therapist.   He is hopeful by the fact that she has attended the sessions.  After discussion, it seems that there may be some resolution after few follow-up visits.  They are both going to have single sessions with the therapist before having another couples session.  Depression  Visit Type: follow-up (Depression, MARGE, Insomnia)  Patient presents with the following symptoms: excessive worry, feelings of hopelessness, muscle tension, nervousness/anxiety, psychomotor agitation and restlessness.  Patient is not experiencing: suicidal ideas, suicidal planning and thoughts of death.  Frequency of symptoms: occasionally   Severity: moderate   Sleep quality: good (Insomnia is well controlled with Lunesta)  Denies suicidal ideation.  Denies AVH.  We will continue to monitor for mood, behavior, and safety.  Continue Viibryd 40 mg daily  Continue Lunesta 2 mg at bedtime  Continue bupropion  mg daily  Follow up 2 weeks    Record  Review is below for 09/21/2023 : I have thoroughly reviewed the patient's electronic medical record to include previous encounters, care everywhere, notes, medications, labs, ELENA and UDS (if applicable), imaging, and EKG's.  Pertinent information is included in this note.  POC Urine Drug Screen, Triage (05/19/2023 12:30)  No current or pertinent labs, imaging, procedures in record  EKG Results:  None in record  Head Imaging:  None in record    09/07/2023 Patient is taking medications as prescribed and is tolerating them well.   He and his wife are still officially . She is residing in her own apartment and they are sharing custody of their daughter every four days. They have MarriageTherapy next week, couples. He says she has agreed to attending.  Depression  Visit Type: follow-up (Depression, MARGE, Insmonia)  Patient presents with the following symptoms: depressed mood, excessive worry, feelings of hopelessness, nervousness/anxiety and restlessness.  Patient is not experiencing: anhedonia, decreased concentration, fatigue, feelings of worthlessness, insomnia (Well controlled with Lunesta), suicidal ideas, suicidal planning and thoughts of death.  Frequency of symptoms: occasionally   Severity: moderate   Sleep quality: good  Compliance with medications:  %  Denies suicidal ideation.  Denies AVH.  We will continue to monitor for mood, behavior, and safety.  Continue Viibryd 40 mg daily  Continue Lunesta 2 mg at bedtime  Continue bupropion  mg daily  Follow up 2 weeks    Record Review is below for 09/07/2023 : I have thoroughly reviewed the patient's electronic medical record to include previous encounters, care everywhere, notes, medications, labs, ELENA and UDS (if applicable), imaging, and EKG's.  Pertinent information is included in this note.  POC Urine Drug Screen, Triage (05/19/2023 12:30)  No current or pertinent labs, imaging, procedures in record  EKG Results:  None in record  Head  Imaging:  None in record    08/24/2023 Patient is taking medications as prescribed and is tolerating them well.  Patient is wife are still planning to attend marriage counseling in September.  Patient's wife has moved out into her own apartment.  They have currently decided on an arrangement of custody for their daughter every 4 days transition.  There are still some issues with communication and trust.  We continue to discuss effective boundary setting while also communicating effectively.  Depression  Visit Type: follow-up (Depression, MARGE, Insomnia)  Patient presents with the following symptoms: excessive worry, feelings of hopelessness, memory impairment, muscle tension, nervousness/anxiety and restlessness.  Patient is not experiencing: anhedonia, decreased concentration, depressed mood, fatigue, insomnia, suicidal ideas, suicidal planning and thoughts of death.  Frequency of symptoms: most days   Severity: causing significant distress   Sleep quality: good (Insomnia is well-controlled with Lunesta)  Denies suicidal ideation.  Denies AVH.  We will continue to monitor for mood, behavior, and safety.  Continue Viibryd 40 mg daily  Continue Lunesta 2 mg at bedtime  Continue bupropion  mg daily  Follow up 2 weeks    Record Review is below for 08/24/2023 : I have thoroughly reviewed the patient's electronic medical record to include previous encounters, care everywhere, notes, medications, labs, ELENA and UDS (if applicable), imaging, and EKG's.  Pertinent information is included in this note.  POC Urine Drug Screen, Triage (05/19/2023 12:30)  No current or pertinent labs, imaging, procedures in record  EKG Results:  None in record  Head Imaging:  None in record      08/03/2023 Patient is taking medications as prescribed and is tolerating them well.   Going to initiate marriage counseling in September, she is still moving out and will be in her own apartment.  He is asking about possibly discontinuing  bupropion XL.  He does believe this medication has helped but he does not want to continue to take multiple medications.  We are in agreement that we will wait until some of these other stressors have been resolved.  Depression and anxiety symptoms are well controlled with Viibryd and bupropion XL.  Insomnia is well controlled with Lunesta.  He enjoys therapy with me and we are continuing to work on setting firm boundaries, improving communication techniques.    Denies suicidal ideation.  Denies AVH.  We will continue to monitor for mood, behavior, and safety.  Continue Viibryd 40 mg daily  Continue Lunesta 2 mg at bedtime  Continue bupropion  mg daily  Follow up 2 weeks    Record Review is below for 08/03/2023 : I have thoroughly reviewed the patient's electronic medical record to include previous encounters, care everywhere, notes, medications, labs, ELENA and UDS (if applicable), imaging, and EKG's.  Pertinent information is included in this note.  POC Urine Drug Screen, Triage (05/19/2023 12:30)  No current or pertinent labs, imaging, procedures in record  EKG Results:  None in record  Head Imaging:  None in record      07/20/2023 Patient is taking medications as prescribed and is tolerating them well.   Since our last encounter, patient and his wife are going to be further , she is planning to move out of their house and into her own apartment. This continues to be a situational stressor for him, he is ultimately concerned with how this is going to affect their youngest child.   He reports that depression and anxiety are well controlled with Viibryd and bupropion XL. Reports increased energy and improved mood.  Sleep: Insomnia is well controlled with Lunesta  He enjoys therapy with me and we are continuing to work on setting firm boundaries, improving communication techniques.  We again discussed my intense recommendation that he and his wife begin marriage counseling/therapy.   Denies suicidal  ideation.  Denies AVH.  We will continue to monitor for mood, behavior, and safety.  Continue Viibryd 40 mg daily  Continue Lunesta 2 mg at bedtime  Continue bupropion  mg daily  Follow up 2 weeks    Record Review is below for 07/20/2023 : I have thoroughly reviewed the patient's electronic medical record to include previous encounters, care everywhere, notes, medications, labs, ELENA and UDS (if applicable), imaging, and EKG's.  Pertinent information is included in this note.  POC Urine Drug Screen, Triage (05/19/2023 12:30)  No current or pertinent labs, imaging, procedures in record  EKG Results:  None in record  Head Imaging:  None in record    07/06/2023 Patient is taking medications as prescribed and is tolerating them well.   Patient continues to see some improvement in his relationship.  Continue to improve communication.  He reports that depression and anxiety are well controlled with Viibryd and bupropion XL.  Sleep: Insomnia is well controlled with Lunesta, he reports by sleep he has had in a long time.  He enjoys therapy with me and we are continuing to work on setting firm boundaries, improving communication techniques.  We again discussed my intense recommendation that he and his wife begin marriage counseling/therapy.   Denies suicidal ideation.  Denies AVH.  We will continue to monitor for mood, behavior, and safety.  Continue Viibryd 40 mg daily  Continue Lunesta 2 mg at bedtime  Continue bupropion  mg daily  Follow up 2 weeks    Record Review is below for 07/06/2023 : I have thoroughly reviewed the patient's electronic medical record to include previous encounters, care everywhere, notes, medications, labs, ELENA and UDS (if applicable), imaging, and EKG's.  Pertinent information is included in this note.  POC Urine Drug Screen, Triage (05/19/2023 12:30)  No current or pertinent labs, imaging, procedures in record  EKG Results:  None in record    06/23/2023 Patient is taking  medications as prescribed and is tolerating them well.   Patient states that he and his wife have had a good couple weeks and have improved in communication.  There is less tension.  They have been spending a little bit more time together.  Patient still reports improved mood, energy, and decreased anxiety.  Sleep: Insomnia is well controlled with Lunesta  Denies suicidal ideation.  Denies AVH.  We will continue to monitor for mood, behavior, and safety.  Continue Viibryd 40 mg daily  Continue Lunesta 2 mg at bedtime  Continue bupropion  mg daily  Follow up 2 weeks    Record Review is below for 06/23/2023 : I have thoroughly reviewed the patient's electronic medical record to include previous encounters, care everywhere, notes, medications, labs, ELENA and UDS (if applicable), imaging, and EKG's.  Pertinent information is included in this note.  POC Urine Drug Screen, Triage (05/19/2023 12:30)  No current or pertinent labs, imaging, procedures in record  EKG Results:  None in record    06/16/2023 Patient is taking medications as prescribed and is tolerating them well.   Feeling better since adding wellbutrin XL, improved mood and energy, decreased anxiety  Sleep: Insomnia is controlled with Lunesta  Having more conversations with his wife, working on communication. Discussing seeking marriage counseling.   Denies suicidal ideation.  Denies AVH.  We will continue to monitor for mood, behavior, and safety.  Continue Viibryd 40 mg daily  Continue Lunesta 2 mg at bedtime  Continue bupropion  mg daily  Follow up 2 weeks    Record Review is below for 06/16/2023 : I have thoroughly reviewed the patient's electronic medical record to include previous encounters, care everywhere, notes, medications, labs, ELENA and UDS (if applicable), imaging, and EKG's.  Pertinent information is included in this note.  No current or pertinent labs, imaging, procedures in record  EKG Results:  None in record    06/09/2023  Patient is taking medications as prescribed and is tolerating them well. Patient reports that he has had some new developments in the situation with his marriage.  He is still ambivalent and contemplating future plans.  Patient states he is sleeping well on the increased dose of Lunesta.  His anxiety and depression symptoms are well controlled with Viibryd. He is describing some trouble with focus, concentration, and decreased energy levels. He is enjoying therapy with me.  We are still utilizing cognitive behavioral therapy to address his current situation. We are discussing referral for couples/marriage counseling as it would be more appropriate for them both to see a different therapist to avoid any potential conflict of interest. Denies suicidal ideation.  Denies AVH.  We will continue to monitor for mood, behavior, and safety.  Continue Viibryd 40 mg daily  Continue Lunesta 2 mg at bedtime  Start bupropion  mg daily  Follow up 2 weeks    Record Review is below for 06/09/2023 : I have thoroughly reviewed the patient's electronic medical record to include previous encounters, care everywhere, notes, medications, labs, ELENA and UDS (if applicable), imaging, and EKG's.  Pertinent information is included in this note.  No current or pertinent labs, imaging, procedures in record  EKG Results:  None in record    05/25/2023 Patient is taking medications as prescribed and is tolerating them well. Patient states his sleep has improved.  He also reports that he is feeling better than he has in a long time and believes the Viibryd is really helping with his depression and anxiety.  He still enjoys therapy with me.  He has had some conversations with his wife.  They are still not significant resolution, however they are communicating slightly better than before.  We discussed some of his progress with using new communication techniques.  He seems to be coping much better with his reaction and response to situations.   His mood has improved. Denies suicidal ideation.  Denies AVH.  We will continue to monitor for mood, behavior, and safety.  Continue Viibryd 40 mg daily  Continue Lunesta 2 mg at bedtime    Record Review is below for 05/25/2023 : I have thoroughly reviewed the patient's electronic medical record to include previous encounters, care everywhere, notes, medications, labs, ELENA and UDS (if applicable), imaging, and EKG's.  Pertinent information is included in this note.  No current or pertinent labs, imaging, procedures in record  EKG Results:  None in record    05/19/2023 Patient is taking medications as prescribed and is tolerating them well. Patient reports he is still having some sleep disturbance.  He says that some nights he is sleeping really well, and sometimes he is having difficulty with either falling asleep or staying asleep.  Patient states that Viibryd is helping with depression and anxiety.  He enjoys therapy with me.  He is still dealing with separation from his wife, however, they are still residing in the same house and sleeping in separate bedrooms.  They have had some interactions with each other.  Some of these discussions have been productive and some have been unproductive.  We discussed setting clear boundaries, expectations, and working on improvement of communication techniques. Denies suicidal ideation.  Denies AVH.  We will continue to monitor for mood, behavior, and safety.  Continue Viibryd 40 mg daily  Increase Lunesta to 2 mg at bedtime  UDS  CSA  Follow-up 1 week    Record Review is below for 05/19/2023 : I have thoroughly reviewed the patient's electronic medical record to include previous encounters, care everywhere, notes, medications, labs, ELENA and UDS (if applicable), imaging, and EKG's.  Pertinent information is included in this note.  No current or pertinent labs, imaging, procedures in record  EKG Results:  None in record    05/10/2023 Patient is taking medications as  "prescribed and is tolerating them well. Patient discontinued abilify related to negative side effects. Abilify made him have nightmares and feel like a \"zombie.\" Increase viibryd to 40mg.  Patient states that he does feel like the Viibryd is helping however he is wondering if we can consider an increase in dose to further target his depression and anxiety.  Lunesta is helping with insomnia.  He is still enjoying therapy with me.  We are discussing the issues of separation from his wife and his situational stressors. Denies suicidal ideation.  Denies AVH. We will continue to monitor for mood, behavior, and safety.  Increase Viibryd to 40 mg daily  Discontinue Abilify  Continue Lunesta 1 mg daily  Follow-up 1 week    Record Review is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

## 2024-02-24 NOTE — PATIENT INSTRUCTIONS
1.  Please return to clinic at your next scheduled visit.  Please contact the clinic ([phone]) at least 24 hours prior in the event you need to cancel.  2.  Do no harm to yourself or others.    3.  Avoid alcohol and drugs.    4.  Take all medications as prescribed.  Please contact the clinic with any concerns. If you are in need of medication refills, please call the clinic at [phone].    5. Should you want to get in touch with your provider, ARMAND Loredo, please contact the office ([phone]), and staff will be able to page Opal directly.  6. In the event you have personal crisis, contact the following crisis numbers: Suicide Prevention Hotline 1-535.903.7497; [facility] Helpline 9-420-675-YFJZ; [facility] Emergency Room [phone]; text HELLO to 079321; or 339.     SPECIFIC RECOMMENDATIONS:     1.      Medications discussed at this encounter:     New Medications Ordered This Visit   Medications    vilazodone (Viibryd) 40 MG tablet tablet     Sig: Take 1 tablet by mouth Daily.     Dispense:  30 tablet     Refill:  1                       2.      Psychotherapy recommendations: We will continue therapy at future visits.     3.     Return to clinic: Return in about 1 month (around 3/22/2024) for Next scheduled follow up.

## 2024-03-21 DIAGNOSIS — F41.1 GENERALIZED ANXIETY DISORDER: ICD-10-CM

## 2024-03-21 DIAGNOSIS — F33.1 MODERATE EPISODE OF RECURRENT MAJOR DEPRESSIVE DISORDER: ICD-10-CM

## 2024-03-21 RX ORDER — BUPROPION HYDROCHLORIDE 150 MG/1
150 TABLET ORAL EVERY MORNING
Qty: 30 TABLET | Refills: 1 | Status: SHIPPED | OUTPATIENT
Start: 2024-03-21

## 2024-03-21 RX ORDER — VILAZODONE HYDROCHLORIDE 40 MG/1
40 TABLET ORAL DAILY
Qty: 30 TABLET | Refills: 1 | Status: SHIPPED | OUTPATIENT
Start: 2024-03-21

## 2024-03-21 NOTE — TELEPHONE ENCOUNTER
REFILL REQUEST    vilazodone (Viibryd) 40 MG tablet tablet (02/22/2024)     PATIENT NO LONGER TAKING WELLBUTRIN 150MG AS OF 02/22/24    NEXT FOLLOW UP WITH PROVIDER-    Appointment with Opal Huang APRN (04/11/2024)

## 2024-04-11 ENCOUNTER — OFFICE VISIT (OUTPATIENT)
Dept: BEHAVIORAL HEALTH | Facility: CLINIC | Age: 39
End: 2024-04-11
Payer: COMMERCIAL

## 2024-04-11 VITALS — HEIGHT: 73 IN | BODY MASS INDEX: 24.25 KG/M2 | OXYGEN SATURATION: 96 % | HEART RATE: 67 BPM | WEIGHT: 183 LBS

## 2024-04-11 DIAGNOSIS — F51.01 PRIMARY INSOMNIA: ICD-10-CM

## 2024-04-11 DIAGNOSIS — F41.1 GENERALIZED ANXIETY DISORDER: ICD-10-CM

## 2024-04-11 DIAGNOSIS — Z63.5 MARITAL DISRUPTION INVOLVING DIVORCE: ICD-10-CM

## 2024-04-11 DIAGNOSIS — F33.1 MODERATE EPISODE OF RECURRENT MAJOR DEPRESSIVE DISORDER: Primary | ICD-10-CM

## 2024-04-11 RX ORDER — BUSPIRONE HYDROCHLORIDE 5 MG/1
5 TABLET ORAL 3 TIMES DAILY
Qty: 90 TABLET | Refills: 1 | Status: SHIPPED | OUTPATIENT
Start: 2024-04-11

## 2024-04-11 SDOH — SOCIAL STABILITY - SOCIAL INSECURITY: DISRUPTION OF FAMILY BY SEPARATION AND DIVORCE: Z63.5

## 2024-04-11 NOTE — PROGRESS NOTES
"[facility]  Medication Management Follow-up    Vital Signs:   Pulse 67   Ht 185.4 cm (73\")   Wt 83 kg (183 lb)   SpO2 96%   BMI 24.14 kg/m²     Chief Complaint: Depression. Anxiety. Insomnia.     History of Present Illness:   Chet Cabrera is a 39 y.o. male who presents today for follow-up and medication management for:    ICD-10-CM ICD-9-CM   1. Moderate episode of recurrent major depressive disorder  F33.1 296.32   2. Generalized anxiety disorder  F41.1 300.02   3. Primary insomnia  F51.01 307.42   4. Marital disruption involving divorce  Z63.5 V61.03       04/11/2024 Patient is taking medications as prescribed and is tolerating them well.   He and his wife have decided to move forward with marriage reconciliation. They are reuniting the family back into one home. He has exhibited tremendous growth and self-reflection throughout this process. However, he is yearning for guidance and resolution. He continues to cope very well and symptoms of depression, anxiety, and insomnia are well-controlled with current medication management.   Depression and anxiety  Visit Type: follow-up (Depression, MARGE, Insomnia)  Patient presents with the following symptoms: excessive worry, feelings of hopelessness, muscle tension, nervousness/anxiety, psychomotor agitation and restlessness.  Patient is not experiencing: suicidal ideas, suicidal planning and thoughts of death.  Frequency of symptoms: occasionally  Severity: moderate  Sleep quality: good (Insomnia is well controlled with Lunesta)  Denies suicidal ideation.  Denies AVH.  We will continue to monitor for mood, behavior, and safety.    Record Review is below for 04/11/2024 :   POC Urine Drug Screen, Triage (05/19/2023 12:30)  No current or pertinent labs, imaging, procedures in record  EKG Results:  None in record  Head Imaging:  None in record    02/22/2024 Patient has discontinued wellbutrin, was wanting to decrease amount of medications.   Since " our last encounter, wife has retracted filing for dissolution of marriage and is requesting reconciliation.   There has been, over the previous year, significant damage/harm done to their marriage contract. They have been experiencing irreconcilable differences in morality, trust, and infidelity. He is experiencing intense ambivalence currently regarding any reconciliation. This is causing increased anxiety and stress. We are working through the   potential outcomes of any decisions made in response to her request. He has exhibited tremendous growth and self-reflection throughout this process. However, he is yearning for guidance and resolution.   Despite his current situation, he is coping very well and symptoms of depression, anxiety, and insomnia are well-controlled with current medication management.   Depression and anxiety  Visit Type: follow-up (Depression, MARGE, Insomnia)  Patient presents with the following symptoms: excessive worry, feelings of hopelessness, muscle tension, nervousness/anxiety, psychomotor agitation and restlessness.  Patient is not experiencing: suicidal ideas, suicidal planning and thoughts of death.  Frequency of symptoms: occasionally  Severity: moderate  Sleep quality: good (Insomnia is well controlled with Lunesta)  Denies suicidal ideation.  Denies AVH.  We will continue to monitor for mood, behavior, and safety.  Continue Viibryd 40 mg daily  Continue Lunesta 2 mg at bedtime  Continue bupropion  mg daily  Follow up 3 weeks    Record Review is below for 02/22/2024 :   POC Urine Drug Screen, Triage (05/19/2023 12:30)  No current or pertinent labs, imaging, procedures in record  EKG Results:  None in record  Head Imaging:  None in record      01/11/2024 Patient is taking medications as prescribed and is tolerating them well.   Wife has filed for dissolution of marriage, will continue to be a lengthy and difficult process until agreements have been completed. Despite this entire  process, he seems to be coping and moving through this with healthy boundaries and coping/defense mechanisms.   He has been performing intense self-reflection and cognitive behaviors have greatly improved since our initial evaluation.   Depression, anxiety, and insomnia are well-controlled with current medication management.   Depression  Visit Type: follow-up (Depression, MARGE, Insomnia)  Patient presents with the following symptoms: excessive worry, feelings of hopelessness, muscle tension, nervousness/anxiety, psychomotor agitation and restlessness.  Patient is not experiencing: suicidal ideas, suicidal planning and thoughts of death.  Frequency of symptoms: occasionally  Severity: moderate  Sleep quality: good (Insomnia is well controlled with Lunesta)  Denies suicidal ideation.  Denies AVH.  We will continue to monitor for mood, behavior, and safety.  Continue Viibryd 40 mg daily  Continue Lunesta 2 mg at bedtime  Continue bupropion  mg daily  Follow up 3 weeks    Record Review is below for 01/11/2024 :   POC Urine Drug Screen, Triage (05/19/2023 12:30)  No current or pertinent labs, imaging, procedures in record  EKG Results:  None in record  Head Imaging:  None in record    12/07/2023 Patient is taking medications as prescribed and is tolerating them well.   He has made a more resolute decision to move forward with dissolution of marriage. This is going to be a long and difficult process related to estate and custody arrangements.   Despite this entire process, he seems to be coping and moving through this with healthy boundaries and coping/defense mechanisms.   Depression, anxiety, and insomnia are well-controlled with current medication management.   Depression  Visit Type: follow-up (Depression, MARGE, Insomnia)  Patient presents with the following symptoms: excessive worry, feelings of hopelessness, muscle tension, nervousness/anxiety, psychomotor agitation and restlessness.  Patient is not  experiencing: suicidal ideas, suicidal planning and thoughts of death.  Frequency of symptoms: occasionally  Severity: moderate  Sleep quality: good (Insomnia is well controlled with Lunesta)  Denies suicidal ideation.  Denies AVH.  We will continue to monitor for mood, behavior, and safety.  Continue Viibryd 40 mg daily  Continue Lunesta 2 mg at bedtime  Continue bupropion  mg daily  Follow up 3 weeks    Record Review is below for 12/07/2023 :   POC Urine Drug Screen, Triage (05/19/2023 12:30)  No current or pertinent labs, imaging, procedures in record  EKG Results:  None in record  Head Imaging:  None in record    11/10/2023 Patient is taking medications as prescribed and is tolerating them well.   He and his wife both had individual sessions and more sessions with marriage therapist.   It appears he is moving more towards a decision of dissolving marriage. There have been some apparent and incongruent behaviors by his wife during this process that have caused him alarm.  There is remains some uncertainty about whether or not they are working on their marriage or rather moving towards divorce.  Despite this entire process, he seems to be coping and moving through this with healthy boundaries and coping/defense mechanisms.   Depression, anxiety, and insomnia are well-controlled with current medication management.   Depression  Visit Type: follow-up (Depression, MARGE, Insomnia)  Patient presents with the following symptoms: excessive worry, feelings of hopelessness, muscle tension, nervousness/anxiety, psychomotor agitation and restlessness.  Patient is not experiencing: suicidal ideas, suicidal planning and thoughts of death.  Frequency of symptoms: occasionally  Severity: moderate  Sleep quality: good (Insomnia is well controlled with Lunesta)  Denies suicidal ideation.  Denies AVH.  We will continue to monitor for mood, behavior, and safety.  Continue Viibryd 40 mg daily  Continue Lunesta 2 mg at  bedtime  Continue bupropion  mg daily  Follow up 3 weeks    Record Review is below for 11/10/2023 : I have thoroughly reviewed the patient's electronic medical record to include previous encounters, care everywhere, notes, medications, labs, ELENA and UDS (if applicable), imaging, and EKG's.  Pertinent information is included in this note.  POC Urine Drug Screen, Triage (05/19/2023 12:30)  No current or pertinent labs, imaging, procedures in record  EKG Results:  None in record  Head Imaging:  None in record      10/20/2023 Patient is taking medications as prescribed and is tolerating them well.   He and his wife both had individual sessions and a session with marriage therapist.   Things are moving along with therapy, however, there is still uncertainty about whether or not they are working on their marriage or rather moving towards divorce.  They continue to be .  She is living in her own apartment.  He expresses very logical and rational response to this situation, he appears to be coping very well despite this intense stressor.  Depression and anxiety are well controlled with current medications.  Depression  Visit Type: follow-up (Depression, MARGE, Insomnia)  Patient presents with the following symptoms: excessive worry, feelings of hopelessness, muscle tension, nervousness/anxiety, psychomotor agitation and restlessness.  Patient is not experiencing: suicidal ideas, suicidal planning and thoughts of death.  Frequency of symptoms: occasionally   Severity: moderate   Sleep quality: good (Insomnia is well controlled with Lunesta)  Denies suicidal ideation.  Denies AVH.  We will continue to monitor for mood, behavior, and safety.  Continue Viibryd 40 mg daily  Continue Lunesta 2 mg at bedtime  Continue bupropion  mg daily  Follow up 3 weeks    Record Review is below for 10/20/2023 : I have thoroughly reviewed the patient's electronic medical record to include previous encounters, care  everywhere, notes, medications, labs, ELENA and UDS (if applicable), imaging, and EKG's.  Pertinent information is included in this note.  POC Urine Drug Screen, Triage (05/19/2023 12:30)  No current or pertinent labs, imaging, procedures in record  EKG Results:  None in record  Head Imaging:  None in record      09/21/2023 Patient is taking medications as prescribed and is tolerating them well.   They had an initial evaluation with the marriage therapist.   He is hopeful by the fact that she has attended the sessions.  After discussion, it seems that there may be some resolution after few follow-up visits.  They are both going to have single sessions with the therapist before having another couples session.  Depression  Visit Type: follow-up (Depression, MARGE, Insomnia)  Patient presents with the following symptoms: excessive worry, feelings of hopelessness, muscle tension, nervousness/anxiety, psychomotor agitation and restlessness.  Patient is not experiencing: suicidal ideas, suicidal planning and thoughts of death.  Frequency of symptoms: occasionally   Severity: moderate   Sleep quality: good (Insomnia is well controlled with Lunesta)  Denies suicidal ideation.  Denies AVH.  We will continue to monitor for mood, behavior, and safety.  Continue Viibryd 40 mg daily  Continue Lunesta 2 mg at bedtime  Continue bupropion  mg daily  Follow up 2 weeks    Record Review is below for 09/21/2023 : I have thoroughly reviewed the patient's electronic medical record to include previous encounters, care everywhere, notes, medications, labs, ELENA and UDS (if applicable), imaging, and EKG's.  Pertinent information is included in this note.  POC Urine Drug Screen, Triage (05/19/2023 12:30)  No current or pertinent labs, imaging, procedures in record  EKG Results:  None in record  Head Imaging:  None in record    09/07/2023 Patient is taking medications as prescribed and is tolerating them well.   He and his wife are  still officially . She is residing in her own apartment and they are sharing custody of their daughter every four days. They have MarriageTherapy next week, couples. He says she has agreed to attending.  Depression  Visit Type: follow-up (Depression, MARGE, Insmonia)  Patient presents with the following symptoms: depressed mood, excessive worry, feelings of hopelessness, nervousness/anxiety and restlessness.  Patient is not experiencing: anhedonia, decreased concentration, fatigue, feelings of worthlessness, insomnia (Well controlled with Lunesta), suicidal ideas, suicidal planning and thoughts of death.  Frequency of symptoms: occasionally   Severity: moderate   Sleep quality: good  Compliance with medications:  %  Denies suicidal ideation.  Denies AVH.  We will continue to monitor for mood, behavior, and safety.  Continue Viibryd 40 mg daily  Continue Lunesta 2 mg at bedtime  Continue bupropion  mg daily  Follow up 2 weeks    Record Review is below for 09/07/2023 : I have thoroughly reviewed the patient's electronic medical record to include previous encounters, care everywhere, notes, medications, labs, ELENA and UDS (if applicable), imaging, and EKG's.  Pertinent information is included in this note.  POC Urine Drug Screen, Triage (05/19/2023 12:30)  No current or pertinent labs, imaging, procedures in record  EKG Results:  None in record  Head Imaging:  None in record    08/24/2023 Patient is taking medications as prescribed and is tolerating them well.  Patient is wife are still planning to attend marriage counseling in September.  Patient's wife has moved out into her own apartment.  They have currently decided on an arrangement of custody for their daughter every 4 days transition.  There are still some issues with communication and trust.  We continue to discuss effective boundary setting while also communicating effectively.  Depression  Visit Type: follow-up (Depression, MAGRE,  Insomnia)  Patient presents with the following symptoms: excessive worry, feelings of hopelessness, memory impairment, muscle tension, nervousness/anxiety and restlessness.  Patient is not experiencing: anhedonia, decreased concentration, depressed mood, fatigue, insomnia, suicidal ideas, suicidal planning and thoughts of death.  Frequency of symptoms: most days   Severity: causing significant distress   Sleep quality: good (Insomnia is well-controlled with Lunesta)  Denies suicidal ideation.  Denies AVH.  We will continue to monitor for mood, behavior, and safety.  Continue Viibryd 40 mg daily  Continue Lunesta 2 mg at bedtime  Continue bupropion  mg daily  Follow up 2 weeks    Record Review is below for 08/24/2023 : I have thoroughly reviewed the patient's electronic medical record to include previous encounters, care everywhere, notes, medications, labs, ELENA and UDS (if applicable), imaging, and EKG's.  Pertinent information is included in this note.  POC Urine Drug Screen, Triage (05/19/2023 12:30)  No current or pertinent labs, imaging, procedures in record  EKG Results:  None in record  Head Imaging:  None in record      08/03/2023 Patient is taking medications as prescribed and is tolerating them well.   Going to initiate marriage counseling in September, she is still moving out and will be in her own apartment.  He is asking about possibly discontinuing bupropion XL.  He does believe this medication has helped but he does not want to continue to take multiple medications.  We are in agreement that we will wait until some of these other stressors have been resolved.  Depression and anxiety symptoms are well controlled with Viibryd and bupropion XL.  Insomnia is well controlled with Lunesta.  He enjoys therapy with me and we are continuing to work on setting firm boundaries, improving communication techniques.    Denies suicidal ideation.  Denies AVH.  We will continue to monitor for mood, behavior,  and safety.  Continue Viibryd 40 mg daily  Continue Lunesta 2 mg at bedtime  Continue bupropion  mg daily  Follow up 2 weeks    Record Review is below for 08/03/2023 : I have thoroughly reviewed the patient's electronic medical record to include previous encounters, care everywhere, notes, medications, labs, ELENA and UDS (if applicable), imaging, and EKG's.  Pertinent information is included in this note.  POC Urine Drug Screen, Triage (05/19/2023 12:30)  No current or pertinent labs, imaging, procedures in record  EKG Results:  None in record  Head Imaging:  None in record      07/20/2023 Patient is taking medications as prescribed and is tolerating them well.   Since our last encounter, patient and his wife are going to be further , she is planning to move out of their house and into her own apartment. This continues to be a situational stressor for him, he is ultimately concerned with how this is going to affect their youngest child.   He reports that depression and anxiety are well controlled with Viibryd and bupropion XL. Reports increased energy and improved mood.  Sleep: Insomnia is well controlled with Lunesta  He enjoys therapy with me and we are continuing to work on setting firm boundaries, improving communication techniques.  We again discussed my intense recommendation that he and his wife begin marriage counseling/therapy.   Denies suicidal ideation.  Denies AVH.  We will continue to monitor for mood, behavior, and safety.  Continue Viibryd 40 mg daily  Continue Lunesta 2 mg at bedtime  Continue bupropion  mg daily  Follow up 2 weeks    Record Review is below for 07/20/2023 : I have thoroughly reviewed the patient's electronic medical record to include previous encounters, care everywhere, notes, medications, labs, ELENA and UDS (if applicable), imaging, and EKG's.  Pertinent information is included in this note.  POC Urine Drug Screen, Triage (05/19/2023 12:30)  No current or  pertinent labs, imaging, procedures in record  EKG Results:  None in record  Head Imaging:  None in record    07/06/2023 Patient is taking medications as prescribed and is tolerating them well.   Patient continues to see some improvement in his relationship.  Continue to improve communication.  He reports that depression and anxiety are well controlled with Viibryd and bupropion XL.  Sleep: Insomnia is well controlled with Lunesta, he reports by sleep he has had in a long time.  He enjoys therapy with me and we are continuing to work on setting firm boundaries, improving communication techniques.  We again discussed my intense recommendation that he and his wife begin marriage counseling/therapy.   Denies suicidal ideation.  Denies AVH.  We will continue to monitor for mood, behavior, and safety.  Continue Viibryd 40 mg daily  Continue Lunesta 2 mg at bedtime  Continue bupropion  mg daily  Follow up 2 weeks    Record Review is below for 07/06/2023 : I have thoroughly reviewed the patient's electronic medical record to include previous encounters, care everywhere, notes, medications, labs, ELENA and UDS (if applicable), imaging, and EKG's.  Pertinent information is included in this note.  POC Urine Drug Screen, Triage (05/19/2023 12:30)  No current or pertinent labs, imaging, procedures in record  EKG Results:  None in record    06/23/2023 Patient is taking medications as prescribed and is tolerating them well.   Patient states that he and his wife have had a good couple weeks and have improved in communication.  There is less tension.  They have been spending a little bit more time together.  Patient still reports improved mood, energy, and decreased anxiety.  Sleep: Insomnia is well controlled with Lunesta  Denies suicidal ideation.  Denies AVH.  We will continue to monitor for mood, behavior, and safety.  Continue Viibryd 40 mg daily  Continue Lunesta 2 mg at bedtime  Continue bupropion  mg  daily  Follow up 2 weeks    Record Review is below for 06/23/2023 : I have thoroughly reviewed the patient's electronic medical record to include previous encounters, care everywhere, notes, medications, labs, ELENA and UDS (if applicable), imaging, and EKG's.  Pertinent information is included in this note.  POC Urine Drug Screen, Triage (05/19/2023 12:30)  No current or pertinent labs, imaging, procedures in record  EKG Results:  None in record    06/16/2023 Patient is taking medications as prescribed and is tolerating them well.   Feeling better since adding wellbutrin XL, improved mood and energy, decreased anxiety  Sleep: Insomnia is controlled with Lunesta  Having more conversations with his wife, working on communication. Discussing seeking marriage counseling.   Denies suicidal ideation.  Denies AVH.  We will continue to monitor for mood, behavior, and safety.  Continue Viibryd 40 mg daily  Continue Lunesta 2 mg at bedtime  Continue bupropion  mg daily  Follow up 2 weeks    Record Review is below for 06/16/2023 : I have thoroughly reviewed the patient's electronic medical record to include previous encounters, care everywhere, notes, medications, labs, ELENA and UDS (if applicable), imaging, and EKG's.  Pertinent information is included in this note.  No current or pertinent labs, imaging, procedures in record  EKG Results:  None in record    06/09/2023 Patient is taking medications as prescribed and is tolerating them well. Patient reports that he has had some new developments in the situation with his marriage.  He is still ambivalent and contemplating future plans.  Patient states he is sleeping well on the increased dose of Lunesta.  His anxiety and depression symptoms are well controlled with Viibryd. He is describing some trouble with focus, concentration, and decreased energy levels. He is enjoying therapy with me.  We are still utilizing cognitive behavioral therapy to address his current  situation. We are discussing referral for couples/marriage counseling as it would be more appropriate for them both to see a different therapist to avoid any potential conflict of interest. Denies suicidal ideation.  Denies AVH.  We will continue to monitor for mood, behavior, and safety.  Continue Viibryd 40 mg daily  Continue Lunesta 2 mg at bedtime  Start bupropion  mg daily  Follow up 2 weeks    Record Review is below for 06/09/2023 : I have thoroughly reviewed the patient's electronic medical record to include previous encounters, care everywhere, notes, medications, labs, ELENA and UDS (if applicable), imaging, and EKG's.  Pertinent information is included in this note.  No current or pertinent labs, imaging, procedures in record  EKG Results:  None in record    05/25/2023 Patient is taking medications as prescribed and is tolerating them well. Patient states his sleep has improved.  He also reports that he is feeling better than he has in a long time and believes the Viibryd is really helping with his depression and anxiety.  He still enjoys therapy with me.  He has had some conversations with his wife.  They are still not significant resolution, however they are communicating slightly better than before.  We discussed some of his progress with using new communication techniques.  He seems to be coping much better with his reaction and response to situations.  His mood has improved. Denies suicidal ideation.  Denies AVH.  We will continue to monitor for mood, behavior, and safety.  Continue Viibryd 40 mg daily  Continue Lunesta 2 mg at bedtime    Record Review is below for 05/25/2023 : I have thoroughly reviewed the patient's electronic medical record to include previous encounters, care everywhere, notes, medications, labs, ELENA and UDS (if applicable), imaging, and EKG's.  Pertinent information is included in this note.  No current or pertinent labs, imaging, procedures in record  EKG  "Results:  None in record    05/19/2023 Patient is taking medications as prescribed and is tolerating them well. Patient reports he is still having some sleep disturbance.  He says that some nights he is sleeping really well, and sometimes he is having difficulty with either falling asleep or staying asleep.  Patient states that Viibryd is helping with depression and anxiety.  He enjoys therapy with me.  He is still dealing with separation from his wife, however, they are still residing in the same house and sleeping in separate bedrooms.  They have had some interactions with each other.  Some of these discussions have been productive and some have been unproductive.  We discussed setting clear boundaries, expectations, and working on improvement of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

## 2024-04-20 NOTE — PATIENT INSTRUCTIONS
1.  Please return to clinic at your next scheduled visit.  Please contact the clinic ([phone]) at least 24 hours prior in the event you need to cancel.  2.  Do no harm to yourself or others.    3.  Avoid alcohol and drugs.    4.  Take all medications as prescribed.  Please contact the clinic with any concerns. If you are in need of medication refills, please call the clinic at [phone].    5. Should you want to get in touch with your provider, ARMAND Loredo, please contact the office ([phone]), and staff will be able to page Opal directly.  6. In the event you have personal crisis, contact the following crisis numbers: Suicide Prevention Hotline 1-976.628.8578; [facility] Helpline 2-066-218-NEDY; [facility] [phone]; text HELLO to 990187; or 585.     SPECIFIC RECOMMENDATIONS:     1.      Medications discussed at this encounter:     New Medications Ordered This Visit   Medications    busPIRone (BUSPAR) 5 MG tablet     Sig: Take 1 tablet by mouth 3 (Three) Times a Day.     Dispense:  90 tablet     Refill:  1                       2.      Psychotherapy recommendations: We will continue therapy at future visits.     3.     Return to clinic: Return in about 2 months (around 6/11/2024) for Next scheduled follow up.

## 2024-05-23 DIAGNOSIS — F33.1 MODERATE EPISODE OF RECURRENT MAJOR DEPRESSIVE DISORDER: ICD-10-CM

## 2024-05-23 DIAGNOSIS — F51.01 PRIMARY INSOMNIA: ICD-10-CM

## 2024-05-23 DIAGNOSIS — F41.1 GENERALIZED ANXIETY DISORDER: ICD-10-CM

## 2024-05-23 RX ORDER — BUPROPION HYDROCHLORIDE 150 MG/1
150 TABLET ORAL EVERY MORNING
Qty: 30 TABLET | Refills: 1 | Status: SHIPPED | OUTPATIENT
Start: 2024-05-23

## 2024-05-23 RX ORDER — ESZOPICLONE 2 MG/1
TABLET, FILM COATED ORAL
Qty: 30 TABLET | Refills: 2 | Status: SHIPPED | OUTPATIENT
Start: 2024-06-02

## 2024-05-23 RX ORDER — VILAZODONE HYDROCHLORIDE 40 MG/1
40 TABLET ORAL DAILY
Qty: 30 TABLET | Refills: 1 | Status: SHIPPED | OUTPATIENT
Start: 2024-05-23

## 2024-05-23 NOTE — TELEPHONE ENCOUNTER
REFILL REQUEST     eszopiclone (LUNESTA) 2 MG tablet (03/01/2024)     vilazodone (VIIBRYD) 40 MG tablet tablet (03/21/2024)     buPROPion XL (WELLBUTRIN XL) 150 MG 24 hr tablet (03/21/2024)     LAST SEEN BY PROVIDER  Office Visit with Opal Huang APRN (04/11/2024)     NEXT FOLLOW UP   Appointment with Opal Huang APRN (06/06/2024)

## 2024-05-31 DIAGNOSIS — F41.1 GENERALIZED ANXIETY DISORDER: ICD-10-CM

## 2024-05-31 RX ORDER — BUSPIRONE HYDROCHLORIDE 5 MG/1
5 TABLET ORAL 3 TIMES DAILY
Qty: 90 TABLET | Refills: 1 | Status: SHIPPED | OUTPATIENT
Start: 2024-05-31

## 2024-05-31 NOTE — TELEPHONE ENCOUNTER
busPIRone (BUSPAR) 5 MG tablet (04/11/2024)     LAST SEEN PROVIDER  Office Visit with Opal Huang APRN (04/11/2024)     NEXT FOLLOW UP   Appointment with Opal Huang APRN (06/06/2024)

## 2024-06-05 NOTE — PROGRESS NOTES
"[facility]  Medication Management Follow-up      Record Review is below for 06/06/2024 :   No current or pertinent labs, imaging, procedures in record  EKG Results:  None in record  Head Imaging:  None in record  Vital Signs:   /75   Pulse 60   Ht 185.4 cm (73\")   Wt 84.4 kg (186 lb)   SpO2 95%   BMI 24.54 kg/m²     Chief Complaint: Depression. Anxiety.     History of Present Illness:   Chet Cabrera is a 39 y.o. male who presents today for follow-up and medication management for:    ICD-10-CM ICD-9-CM   1. Moderate episode of recurrent major depressive disorder  F33.1 296.32   2. Generalized anxiety disorder  F41.1 300.02   3. Primary insomnia  F51.01 307.42   4. Medication management  Z79.899 V58.69       06/06/2024 Patient is taking medications as prescribed and is tolerating them well.   Depression and Anxiety  Progression of symptoms, frequency, and intensity is gradually improving. Patient continues to experience low mood, psychomotor agitation, anxiety, restlessness, feeling keyed up or on edge, sleep disturbance, and these symptoms are causing significant distress or impairment. Patient denies feeling worthless, guilty, hopelessness,. Denies thinking about death and dying, suicidal ideation, planning, or intent to self-harm.  Denies AVH.  Clinically significant distress or impairment in social, occupational, or other important areas of functioning is improving.  Insomnia  Is sometimes able to abstain from using lunesta for insomnia and get a good night's sleep. Progression of symptoms, frequency, and intensity is stable, medication efficacy noted, and well-controlled with current medications.   CBT/Supportive  Allowed patient to process topics such as continues the process of marriage reconciliation, they are residing together as a family again. Some inter-familial conflict, discussing conflict resolution strategies. Individual psychotherapy was provided utilizing solution " focused techniques to restore adaptive functioning, provide symptom relief, discuss values and strengths, manage stress, identify triggers, recognize patterns of behavior, acknowledge sources of feelings and behaviors, assess symptoms, provide support, and discuss interpersonal conflicts. The therapeutic alliance was strengthened to encourage the patient to express their thoughts and feelings.     04/11/2024 Patient is taking medications as prescribed and is tolerating them well.   He and his wife have decided to move forward with marriage reconciliation. They are reuniting the family back into one home. He has exhibited tremendous growth and self-reflection throughout this process. However, he is yearning for guidance and resolution. He continues to cope very well and symptoms of depression, anxiety, and insomnia are well-controlled with current medication management.   Depression and anxiety  Visit Type: follow-up (Depression, MARGE, Insomnia)  Patient presents with the following symptoms: excessive worry, feelings of hopelessness, muscle tension, nervousness/anxiety, psychomotor agitation and restlessness.  Patient is not experiencing: suicidal ideas, suicidal planning and thoughts of death.  Frequency of symptoms: occasionally  Severity: moderate  Sleep quality: good (Insomnia is well controlled with Lunesta)  Denies suicidal ideation.  Denies AVH.  We will continue to monitor for mood, behavior, and safety.  Continue Viibryd 40 mg daily  Continue Lunesta 2 mg at bedtime  Continue bupropion  mg daily  Start buspar 5 mg three times daily as needed for anxiety  Follow up 3 weeks    Record Review is below for 04/11/2024 :   POC Urine Drug Screen, Triage (05/19/2023 12:30)  No current or pertinent labs, imaging, procedures in record  EKG Results:  None in record  Head Imaging:  None in record    02/22/2024 Patient has discontinued wellbutrin, was wanting to decrease amount of medications.   Since our last  encounter, wife has retracted filing for dissolution of marriage and is requesting reconciliation.   There has been, over the previous year, significant damage/harm done to their marriage contract. They have been experiencing irreconcilable differences in morality, trust, and infidelity. He is experiencing intense ambivalence currently regarding any reconciliation. This is causing increased anxiety and stress. We are working through the   potential outcomes of any decisions made in response to her request. He has exhibited tremendous growth and self-reflection throughout this process. However, he is yearning for guidance and resolution.   Despite his current situation, he is coping very well and symptoms of depression, anxiety, and insomnia are well-controlled with current medication management.   Depression and anxiety  Visit Type: follow-up (Depression, MARGE, Insomnia)  Patient presents with the following symptoms: excessive worry, feelings of hopelessness, muscle tension, nervousness/anxiety, psychomotor agitation and restlessness.  Patient is not experiencing: suicidal ideas, suicidal planning and thoughts of death.  Frequency of symptoms: occasionally  Severity: moderate  Sleep quality: good (Insomnia is well controlled with Lunesta)  Denies suicidal ideation.  Denies AVH.  We will continue to monitor for mood, behavior, and safety.  Continue Viibryd 40 mg daily  Continue Lunesta 2 mg at bedtime  Continue bupropion  mg daily  Follow up 3 weeks    Record Review is below for 02/22/2024 :   POC Urine Drug Screen, Triage (05/19/2023 12:30)  No current or pertinent labs, imaging, procedures in record  EKG Results:  None in record  Head Imaging:  None in record      01/11/2024 Patient is taking medications as prescribed and is tolerating them well.   Wife has filed for dissolution of marriage, will continue to be a lengthy and difficult process until agreements have been completed. Despite this entire process,  he seems to be coping and moving through this with healthy boundaries and coping/defense mechanisms.   He has been performing intense self-reflection and cognitive behaviors have greatly improved since our initial evaluation.   Depression, anxiety, and insomnia are well-controlled with current medication management.   Depression  Visit Type: follow-up (Depression, MARGE, Insomnia)  Patient presents with the following symptoms: excessive worry, feelings of hopelessness, muscle tension, nervousness/anxiety, psychomotor agitation and restlessness.  Patient is not experiencing: suicidal ideas, suicidal planning and thoughts of death.  Frequency of symptoms: occasionally  Severity: moderate  Sleep quality: good (Insomnia is well controlled with Lunesta)  Denies suicidal ideation.  Denies AVH.  We will continue to monitor for mood, behavior, and safety.  Continue Viibryd 40 mg daily  Continue Lunesta 2 mg at bedtime  Continue bupropion  mg daily  Follow up 3 weeks    Record Review is below for 01/11/2024 :   POC Urine Drug Screen, Triage (05/19/2023 12:30)  No current or pertinent labs, imaging, procedures in record  EKG Results:  None in record  Head Imaging:  None in record    12/07/2023 Patient is taking medications as prescribed and is tolerating them well.   He has made a more resolute decision to move forward with dissolution of marriage. This is going to be a long and difficult process related to estate and custody arrangements.   Despite this entire process, he seems to be coping and moving through this with healthy boundaries and coping/defense mechanisms.   Depression, anxiety, and insomnia are well-controlled with current medication management.   Depression  Visit Type: follow-up (Depression, MARGE, Insomnia)  Patient presents with the following symptoms: excessive worry, feelings of hopelessness, muscle tension, nervousness/anxiety, psychomotor agitation and restlessness.  Patient is not experiencing:  suicidal ideas, suicidal planning and thoughts of death.  Frequency of symptoms: occasionally  Severity: moderate  Sleep quality: good (Insomnia is well controlled with Lunesta)  Denies suicidal ideation.  Denies AVH.  We will continue to monitor for mood, behavior, and safety.  Continue Viibryd 40 mg daily  Continue Lunesta 2 mg at bedtime  Continue bupropion  mg daily  Follow up 3 weeks    Record Review is below for 12/07/2023 :   POC Urine Drug Screen, Triage (05/19/2023 12:30)  No current or pertinent labs, imaging, procedures in record  EKG Results:  None in record  Head Imaging:  None in record    11/10/2023 Patient is taking medications as prescribed and is tolerating them well.   He and his wife both had individual sessions and more sessions with marriage therapist.   It appears he is moving more towards a decision of dissolving marriage. There have been some apparent and incongruent behaviors by his wife during this process that have caused him alarm.  There is remains some uncertainty about whether or not they are working on their marriage or rather moving towards divorce.  Despite this entire process, he seems to be coping and moving through this with healthy boundaries and coping/defense mechanisms.   Depression, anxiety, and insomnia are well-controlled with current medication management.   Depression  Visit Type: follow-up (Depression, MARGE, Insomnia)  Patient presents with the following symptoms: excessive worry, feelings of hopelessness, muscle tension, nervousness/anxiety, psychomotor agitation and restlessness.  Patient is not experiencing: suicidal ideas, suicidal planning and thoughts of death.  Frequency of symptoms: occasionally  Severity: moderate  Sleep quality: good (Insomnia is well controlled with Lunesta)  Denies suicidal ideation.  Denies AVH.  We will continue to monitor for mood, behavior, and safety.  Continue Viibryd 40 mg daily  Continue Lunesta 2 mg at bedtime  Continue  bupropion  mg daily  Follow up 3 weeks    Record Review is below for 11/10/2023 : I have thoroughly reviewed the patient's electronic medical record to include previous encounters, care everywhere, notes, medications, labs, ELENA and UDS (if applicable), imaging, and EKG's.  Pertinent information is included in this note.  POC Urine Drug Screen, Triage (05/19/2023 12:30)  No current or pertinent labs, imaging, procedures in record  EKG Results:  None in record  Head Imaging:  None in record      10/20/2023 Patient is taking medications as prescribed and is tolerating them well.   He and his wife both had individual sessions and a session with marriage therapist.   Things are moving along with therapy, however, there is still uncertainty about whether or not they are working on their marriage or rather moving towards divorce.  They continue to be .  She is living in her own apartment.  He expresses very logical and rational response to this situation, he appears to be coping very well despite this intense stressor.  Depression and anxiety are well controlled with current medications.  Depression  Visit Type: follow-up (Depression, MARGE, Insomnia)  Patient presents with the following symptoms: excessive worry, feelings of hopelessness, muscle tension, nervousness/anxiety, psychomotor agitation and restlessness.  Patient is not experiencing: suicidal ideas, suicidal planning and thoughts of death.  Frequency of symptoms: occasionally   Severity: moderate   Sleep quality: good (Insomnia is well controlled with Lunesta)  Denies suicidal ideation.  Denies AVH.  We will continue to monitor for mood, behavior, and safety.  Continue Viibryd 40 mg daily  Continue Lunesta 2 mg at bedtime  Continue bupropion  mg daily  Follow up 3 weeks    Record Review is below for 10/20/2023 : I have thoroughly reviewed the patient's electronic medical record to include previous encounters, care everywhere, notes,  medications, labs, ELENA and UDS (if applicable), imaging, and EKG's.  Pertinent information is included in this note.  POC Urine Drug Screen, Triage (05/19/2023 12:30)  No current or pertinent labs, imaging, procedures in record  EKG Results:  None in record  Head Imaging:  None in record      09/21/2023 Patient is taking medications as prescribed and is tolerating them well.   They had an initial evaluation with the marriage therapist.   He is hopeful by the fact that she has attended the sessions.  After discussion, it seems that there may be some resolution after few follow-up visits.  They are both going to have single sessions with the therapist before having another couples session.  Depression  Visit Type: follow-up (Depression, MARGE, Insomnia)  Patient presents with the following symptoms: excessive worry, feelings of hopelessness, muscle tension, nervousness/anxiety, psychomotor agitation and restlessness.  Patient is not experiencing: suicidal ideas, suicidal planning and thoughts of death.  Frequency of symptoms: occasionally   Severity: moderate   Sleep quality: good (Insomnia is well controlled with Lunesta)  Denies suicidal ideation.  Denies AVH.  We will continue to monitor for mood, behavior, and safety.  Continue Viibryd 40 mg daily  Continue Lunesta 2 mg at bedtime  Continue bupropion  mg daily  Follow up 2 weeks    Record Review is below for 09/21/2023 : I have thoroughly reviewed the patient's electronic medical record to include previous encounters, care everywhere, notes, medications, labs, ELENA and UDS (if applicable), imaging, and EKG's.  Pertinent information is included in this note.  POC Urine Drug Screen, Triage (05/19/2023 12:30)  No current or pertinent labs, imaging, procedures in record  EKG Results:  None in record  Head Imaging:  None in record    09/07/2023 Patient is taking medications as prescribed and is tolerating them well.   He and his wife are still officially  . She is residing in her own apartment and they are sharing custody of their daughter every four days. They have MarriageTherapy next week, couples. He says she has agreed to attending.  Depression  Visit Type: follow-up (Depression, MARGE, Insmonia)  Patient presents with the following symptoms: depressed mood, excessive worry, feelings of hopelessness, nervousness/anxiety and restlessness.  Patient is not experiencing: anhedonia, decreased concentration, fatigue, feelings of worthlessness, insomnia (Well controlled with Lunesta), suicidal ideas, suicidal planning and thoughts of death.  Frequency of symptoms: occasionally   Severity: moderate   Sleep quality: good  Compliance with medications:  %  Denies suicidal ideation.  Denies AVH.  We will continue to monitor for mood, behavior, and safety.  Continue Viibryd 40 mg daily  Continue Lunesta 2 mg at bedtime  Continue bupropion  mg daily  Follow up 2 weeks    Record Review is below for 09/07/2023 : I have thoroughly reviewed the patient's electronic medical record to include previous encounters, care everywhere, notes, medications, labs, ELENA and UDS (if applicable), imaging, and EKG's.  Pertinent information is included in this note.  POC Urine Drug Screen, Triage (05/19/2023 12:30)  No current or pertinent labs, imaging, procedures in record  EKG Results:  None in record  Head Imaging:  None in record    08/24/2023 Patient is taking medications as prescribed and is tolerating them well.  Patient is wife are still planning to attend marriage counseling in September.  Patient's wife has moved out into her own apartment.  They have currently decided on an arrangement of custody for their daughter every 4 days transition.  There are still some issues with communication and trust.  We continue to discuss effective boundary setting while also communicating effectively.  Depression  Visit Type: follow-up (Depression, MARGE, Insomnia)  Patient  presents with the following symptoms: excessive worry, feelings of hopelessness, memory impairment, muscle tension, nervousness/anxiety and restlessness.  Patient is not experiencing: anhedonia, decreased concentration, depressed mood, fatigue, insomnia, suicidal ideas, suicidal planning and thoughts of death.  Frequency of symptoms: most days   Severity: causing significant distress   Sleep quality: good (Insomnia is well-controlled with Lunesta)  Denies suicidal ideation.  Denies AVH.  We will continue to monitor for mood, behavior, and safety.  Continue Viibryd 40 mg daily  Continue Lunesta 2 mg at bedtime  Continue bupropion  mg daily  Follow up 2 weeks    Record Review is below for 08/24/2023 : I have thoroughly reviewed the patient's electronic medical record to include previous encounters, care everywhere, notes, medications, labs, ELENA and UDS (if applicable), imaging, and EKG's.  Pertinent information is included in this note.  POC Urine Drug Screen, Triage (05/19/2023 12:30)  No current or pertinent labs, imaging, procedures in record  EKG Results:  None in record  Head Imaging:  None in record      08/03/2023 Patient is taking medications as prescribed and is tolerating them well.   Going to initiate marriage counseling in September, she is still moving out and will be in her own apartment.  He is asking about possibly discontinuing bupropion XL.  He does believe this medication has helped but he does not want to continue to take multiple medications.  We are in agreement that we will wait until some of these other stressors have been resolved.  Depression and anxiety symptoms are well controlled with Viibryd and bupropion XL.  Insomnia is well controlled with Lunesta.  He enjoys therapy with me and we are continuing to work on setting firm boundaries, improving communication techniques.    Denies suicidal ideation.  Denies AVH.  We will continue to monitor for mood, behavior, and  safety.  Continue Viibryd 40 mg daily  Continue Lunesta 2 mg at bedtime  Continue bupropion  mg daily  Follow up 2 weeks    Record Review is below for 08/03/2023 : I have thoroughly reviewed the patient's electronic medical record to include previous encounters, care everywhere, notes, medications, labs, ELENA and UDS (if applicable), imaging, and EKG's.  Pertinent information is included in this note.  POC Urine Drug Screen, Triage (05/19/2023 12:30)  No current or pertinent labs, imaging, procedures in record  EKG Results:  None in record  Head Imaging:  None in record      07/20/2023 Patient is taking medications as prescribed and is tolerating them well.   Since our last encounter, patient and his wife are going to be further , she is planning to move out of their house and into her own apartment. This continues to be a situational stressor for him, he is ultimately concerned with how this is going to affect their youngest child.   He reports that depression and anxiety are well controlled with Viibryd and bupropion XL. Reports increased energy and improved mood.  Sleep: Insomnia is well controlled with Lunesta  He enjoys therapy with me and we are continuing to work on setting firm boundaries, improving communication techniques.  We again discussed my intense recommendation that he and his wife begin marriage counseling/therapy.   Denies suicidal ideation.  Denies AVH.  We will continue to monitor for mood, behavior, and safety.  Continue Viibryd 40 mg daily  Continue Lunesta 2 mg at bedtime  Continue bupropion  mg daily  Follow up 2 weeks    Record Review is below for 07/20/2023 : I have thoroughly reviewed the patient's electronic medical record to include previous encounters, care everywhere, notes, medications, labs, ELENA and UDS (if applicable), imaging, and EKG's.  Pertinent information is included in this note.  POC Urine Drug Screen, Triage (05/19/2023 12:30)  No current or  pertinent labs, imaging, procedures in record  EKG Results:  None in record  Head Imaging:  None in record    07/06/2023 Patient is taking medications as prescribed and is tolerating them well.   Patient continues to see some improvement in his relationship.  Continue to improve communication.  He reports that depression and anxiety are well controlled with Viibryd and bupropion XL.  Sleep: Insomnia is well controlled with Lunesta, he reports by sleep he has had in a long time.  He enjoys therapy with me and we are continuing to work on setting firm boundaries, improving communication techniques.  We again discussed my intense recommendation that he and his wife begin marriage counseling/therapy.   Denies suicidal ideation.  Denies AVH.  We will continue to monitor for mood, behavior, and safety.  Continue Viibryd 40 mg daily  Continue Lunesta 2 mg at bedtime  Continue bupropion  mg daily  Follow up 2 weeks    Record Review is below for 07/06/2023 : I have thoroughly reviewed the patient's electronic medical record to include previous encounters, care everywhere, notes, medications, labs, ELENA and UDS (if applicable), imaging, and EKG's.  Pertinent information is included in this note.  POC Urine Drug Screen, Triage (05/19/2023 12:30)  No current or pertinent labs, imaging, procedures in record  EKG Results:  None in record    06/23/2023 Patient is taking medications as prescribed and is tolerating them well.   Patient states that he and his wife have had a good couple weeks and have improved in communication.  There is less tension.  They have been spending a little bit more time together.  Patient still reports improved mood, energy, and decreased anxiety.  Sleep: Insomnia is well controlled with Lunesta  Denies suicidal ideation.  Denies AVH.  We will continue to monitor for mood, behavior, and safety.  Continue Viibryd 40 mg daily  Continue Lunesta 2 mg at bedtime  Continue bupropion  mg  daily  Follow up 2 weeks    Record Review is below for 06/23/2023 : I have thoroughly reviewed the patient's electronic medical record to include previous encounters, care everywhere, notes, medications, labs, ELENA and UDS (if applicable), imaging, and EKG's.  Pertinent information is included in this note.  POC Urine Drug Screen, Triage (05/19/2023 12:30)  No current or pertinent labs, imaging, procedures in record  EKG Results:  None in record    06/16/2023 Patient is taking medications as prescribed and is tolerating them well.   Feeling better since adding wellbutrin XL, improved mood and energy, decreased anxiety  Sleep: Insomnia is controlled with Lunesta  Having more conversations with his wife, working on communication. Discussing seeking marriage counseling.   Denies suicidal ideation.  Denies AVH.  We will continue to monitor for mood, behavior, and safety.  Continue Viibryd 40 mg daily  Continue Lunesta 2 mg at bedtime  Continue bupropion  mg daily  Follow up 2 weeks    Record Review is below for 06/16/2023 : I have thoroughly reviewed the patient's electronic medical record to include previous encounters, care everywhere, notes, medications, labs, ELENA and UDS (if applicable), imaging, and EKG's.  Pertinent information is included in this note.  No current or pertinent labs, imaging, procedures in record  EKG Results:  None in record    06/09/2023 Patient is taking medications as prescribed and is tolerating them well. Patient reports that he has had some new developments in the situation with his marriage.  He is still ambivalent and contemplating future plans.  Patient states he is sleeping well on the increased dose of Lunesta.  His anxiety and depression symptoms are well controlled with Viibryd. He is describing some trouble with focus, concentration, and decreased energy levels. He is enjoying therapy with me.  We are still utilizing cognitive behavioral therapy to address his current  situation. We are discussing referral for couples/marriage counseling as it would be more appropriate for them both to see a different therapist to avoid any potential conflict of interest. Denies suicidal ideation.  Denies AVH.  We will continue to monitor for mood, behavior, and safety.  Continue Viibryd 40 mg daily  Continue Lunesta 2 mg at bedtime  Start bupropion  mg daily  Follow up 2 weeks    Record Review is below for 06/09/2023 : I have thoroughly reviewed the patient's electronic medical record to include previous encounters, care everywhere, notes, medications, labs, ELENA and UDS (if applicable), imaging, and EKG's.  Pertinent information is included in this note.  No current or pertinent labs, imaging, procedures in record  EKG Results:  None in record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

## 2024-06-06 ENCOUNTER — CLINICAL SUPPORT (OUTPATIENT)
Dept: FAMILY MEDICINE CLINIC | Facility: CLINIC | Age: 39
End: 2024-06-06
Payer: COMMERCIAL

## 2024-06-06 ENCOUNTER — OFFICE VISIT (OUTPATIENT)
Dept: BEHAVIORAL HEALTH | Facility: CLINIC | Age: 39
End: 2024-06-06
Payer: COMMERCIAL

## 2024-06-06 VITALS
BODY MASS INDEX: 24.65 KG/M2 | DIASTOLIC BLOOD PRESSURE: 75 MMHG | OXYGEN SATURATION: 95 % | HEIGHT: 73 IN | WEIGHT: 186 LBS | SYSTOLIC BLOOD PRESSURE: 118 MMHG | HEART RATE: 60 BPM

## 2024-06-06 DIAGNOSIS — F33.1 MODERATE EPISODE OF RECURRENT MAJOR DEPRESSIVE DISORDER: Primary | ICD-10-CM

## 2024-06-06 DIAGNOSIS — F41.1 GENERALIZED ANXIETY DISORDER: ICD-10-CM

## 2024-06-06 DIAGNOSIS — F51.01 PRIMARY INSOMNIA: ICD-10-CM

## 2024-06-06 DIAGNOSIS — Z79.899 MEDICATION MANAGEMENT: ICD-10-CM

## 2024-06-06 DIAGNOSIS — Z79.899 MEDICATION MANAGEMENT: Primary | ICD-10-CM

## 2024-06-06 LAB
AMPHET+METHAMPHET UR QL: NEGATIVE
AMPHETAMINE INTERNAL CONTROL: NORMAL
AMPHETAMINES UR QL: NEGATIVE
BARBITURATE INTERNAL CONTROL: NORMAL
BARBITURATES UR QL SCN: NEGATIVE
BENZODIAZ UR QL SCN: NEGATIVE
BENZODIAZEPINE INTERNAL CONTROL: NORMAL
BUPRENORPHINE INTERNAL CONTROL: NORMAL
BUPRENORPHINE SERPL-MCNC: NEGATIVE NG/ML
CANNABINOIDS SERPL QL: NEGATIVE
COCAINE INTERNAL CONTROL: NORMAL
COCAINE UR QL: NEGATIVE
EXPIRATION DATE: NORMAL
Lab: NORMAL
MDMA (ECSTASY) INTERNAL CONTROL: NORMAL
MDMA UR QL SCN: NEGATIVE
METHADONE INTERNAL CONTROL: NORMAL
METHADONE UR QL SCN: NEGATIVE
METHAMPHETAMINE INTERNAL CONTROL: NORMAL
MORPHINE INTERNAL CONTROL: NORMAL
MORPHINE/OPIATES SCREEN, URINE: NEGATIVE
OXYCODONE INTERNAL CONTROL: NORMAL
OXYCODONE UR QL SCN: NEGATIVE
PCP UR QL SCN: NEGATIVE
PHENCYCLIDINE INTERNAL CONTROL: NORMAL
THC INTERNAL CONTROL: NORMAL

## 2024-06-06 PROCEDURE — 80305 DRUG TEST PRSMV DIR OPT OBS: CPT | Performed by: FAMILY MEDICINE

## 2024-06-06 NOTE — PATIENT INSTRUCTIONS
1.  Please return to clinic at your next scheduled visit.  Please contact the clinic ([phone]) at least 24 hours prior in the event you need to cancel.  2.  Do no harm to yourself or others.    3.  Avoid alcohol and drugs.    4.  Take all medications as prescribed.  Please contact the clinic with any concerns. If you are in need of medication refills, please call the clinic at [phone].    5. Should you want to get in touch with your provider, ARMAND Loredo, please contact the office ([phone]), and staff will be able to page Opal directly.  6. In the event you have personal crisis, contact the following crisis numbers: Suicide Prevention Hotline 1-541.683.8168; [facility] Helpline 6-524-164-AOQI; [facility] [phone]; text HELLO to 751981; or 262.     SPECIFIC RECOMMENDATIONS:     1.      Medications discussed at this encounter:     No orders of the defined types were placed in this encounter.                      2.      Psychotherapy recommendations: We will continue therapy at future visits.     3.     Return to clinic: Return in about 6 months (around 12/6/2024).

## 2024-07-31 DIAGNOSIS — F41.1 GENERALIZED ANXIETY DISORDER: ICD-10-CM

## 2024-07-31 RX ORDER — BUSPIRONE HYDROCHLORIDE 5 MG/1
5 TABLET ORAL 3 TIMES DAILY
Qty: 90 TABLET | Refills: 1 | Status: SHIPPED | OUTPATIENT
Start: 2024-07-31

## 2024-07-31 NOTE — TELEPHONE ENCOUNTER
buspirone 5 mg tablet     Last ordered: 2 months ago (5/31/2024) by ARMAND Loredo     Follow up 12/05/2024

## 2024-09-03 DIAGNOSIS — F51.01 PRIMARY INSOMNIA: ICD-10-CM

## 2024-09-03 RX ORDER — ESZOPICLONE 2 MG/1
2 TABLET, FILM COATED ORAL NIGHTLY
Qty: 30 TABLET | Refills: 2 | Status: SHIPPED | OUTPATIENT
Start: 2024-09-11

## 2024-09-03 NOTE — TELEPHONE ENCOUNTER
REFILL REQUEST FROM THE PHARMACY FOR PTS LUNESTA 2 MG TABLETS.    eszopiclone (LUNESTA) 2 MG tablet (06/02/2024)     FOLLOW UP APPT ON 12/05/2024.  PT LAST SEEN ON 06/06/2024.

## 2024-10-10 DIAGNOSIS — F33.1 MODERATE EPISODE OF RECURRENT MAJOR DEPRESSIVE DISORDER: ICD-10-CM

## 2024-10-10 DIAGNOSIS — F41.1 GENERALIZED ANXIETY DISORDER: ICD-10-CM

## 2024-10-10 RX ORDER — BUSPIRONE HYDROCHLORIDE 5 MG/1
5 TABLET ORAL 3 TIMES DAILY
Qty: 90 TABLET | Refills: 1 | OUTPATIENT
Start: 2024-10-10

## 2024-10-10 RX ORDER — VILAZODONE HYDROCHLORIDE 40 MG/1
40 TABLET ORAL DAILY
Qty: 30 TABLET | Refills: 1 | OUTPATIENT
Start: 2024-10-10

## 2024-10-10 NOTE — TELEPHONE ENCOUNTER
REFILL REQUEST FROM THE PT AND OR PHARMACY FOR PTS:     vilazodone (VIIBRYD) 40 MG tablet tablet (05/23/2024)     busPIRone (BUSPAR) 5 MG tablet (07/31/2024)     FOLLOW UP APPT- 12/05/2024  PT LAST SEEN ON- 06/06/2024.    ROUTING TO COVERING PROVIDER.

## 2024-10-18 DIAGNOSIS — F33.1 MODERATE EPISODE OF RECURRENT MAJOR DEPRESSIVE DISORDER: ICD-10-CM

## 2024-10-18 DIAGNOSIS — F41.1 GENERALIZED ANXIETY DISORDER: ICD-10-CM

## 2024-10-18 RX ORDER — BUSPIRONE HYDROCHLORIDE 5 MG/1
5 TABLET ORAL 3 TIMES DAILY
Qty: 90 TABLET | Refills: 1 | Status: SHIPPED | OUTPATIENT
Start: 2024-10-18

## 2024-10-18 RX ORDER — VILAZODONE HYDROCHLORIDE 40 MG/1
40 TABLET ORAL DAILY
Qty: 30 TABLET | Refills: 1 | Status: SHIPPED | OUTPATIENT
Start: 2024-10-18

## 2024-10-18 NOTE — TELEPHONE ENCOUNTER
NEXT VISIT WITH PROVIDER Appointment with Opal Huang APRN (12/05/2024)     LAST SEEN BY PROVIDER Office Visit with Opal Huang APRN (06/06/2024)     LAST MED REFILL: busPIRone (BUSPAR) 5 MG tablet (07/31/2024)     LAST MED REFILL #2: vilazodone (VIIBRYD) 40 MG tablet tablet (05/23/2024)

## 2024-12-05 ENCOUNTER — OFFICE VISIT (OUTPATIENT)
Dept: BEHAVIORAL HEALTH | Facility: CLINIC | Age: 39
End: 2024-12-05
Payer: MEDICAID

## 2024-12-05 VITALS
WEIGHT: 192 LBS | SYSTOLIC BLOOD PRESSURE: 127 MMHG | BODY MASS INDEX: 25.45 KG/M2 | HEART RATE: 56 BPM | HEIGHT: 73 IN | DIASTOLIC BLOOD PRESSURE: 75 MMHG

## 2024-12-05 DIAGNOSIS — F51.01 PRIMARY INSOMNIA: ICD-10-CM

## 2024-12-05 DIAGNOSIS — F41.1 GENERALIZED ANXIETY DISORDER: ICD-10-CM

## 2024-12-05 DIAGNOSIS — F33.1 MODERATE EPISODE OF RECURRENT MAJOR DEPRESSIVE DISORDER: Primary | ICD-10-CM

## 2024-12-05 NOTE — PROGRESS NOTES
"[facility]  Medication Management Follow-up      Record Review is below for 12/05/2024 :   No current or pertinent labs, imaging, procedures in record  EKG Results:  None in record  Head Imaging:  None in record  Vital Signs:   /75   Pulse 56   Ht 185.4 cm (72.99\")   Wt 87.1 kg (192 lb)   BMI 25.34 kg/m²     Chief Complaint: Depression. Anxiety.     History of Present Illness:   Chet Cabrera is a 39 y.o. male who presents today for follow-up and medication management for:    ICD-10-CM ICD-9-CM   1. Moderate episode of recurrent major depressive disorder  F33.1 296.32   2. Generalized anxiety disorder  F41.1 300.02   3. Primary insomnia  F51.01 307.42       12/05/2024 Patient is taking medications as prescribed and is tolerating them well.   Depression and Anxiety  Progression of symptoms, frequency, and intensity is waxing and waning but better overall. Patient continues to experience excessive anxiety and worry, anxiety, difficulty controlling the worry, and these symptoms are causing significant distress or impairment. Patient denies feelings of sadness, low mood, feeling worthless, guilty, hopelessness,. Denies thinking about death and dying, suicidal ideation, planning, or intent to self-harm.  Denies AVH.  Clinically significant distress or impairment in social, occupational, or other important areas of functioning is waxing and waning but better overall.  Insomnia  Progression of symptoms, frequency, and intensity is improving. Patient experiences challenges difficulty falling asleep (onset insomnia) with inability to fall asleep beyond 20-30 minutes, which occurs even under ideal circumstances.   CBT/Supportive  Allowed patient to process topics such as the presence of marriage reconciliation has slightly improved, continues with some communication challenges. Individual psychotherapy was provided utilizing solution focused techniques to restore adaptive functioning, provide " symptom relief, discuss values and strengths, manage stress, identify triggers, recognize patterns of behavior, acknowledge sources of feelings and behaviors, assess symptoms, provide support, and discuss interpersonal conflicts. The therapeutic alliance was strengthened to encourage the patient to express their thoughts and feelings.     06/06/2024 Patient is taking medications as prescribed and is tolerating them well.   Depression and Anxiety  Progression of symptoms, frequency, and intensity is gradually improving. Patient continues to experience low mood, psychomotor agitation, anxiety, restlessness, feeling keyed up or on edge, sleep disturbance, and these symptoms are causing significant distress or impairment. Patient denies feeling worthless, guilty, hopelessness,. Denies thinking about death and dying, suicidal ideation, planning, or intent to self-harm.  Denies AVH.  Clinically significant distress or impairment in social, occupational, or other important areas of functioning is improving.  Insomnia  Is sometimes able to abstain from using lunesta for insomnia and get a good night's sleep. Progression of symptoms, frequency, and intensity is stable, medication efficacy noted, and well-controlled with current medications.   CBT/Supportive  Allowed patient to process topics such as continues the process of marriage reconciliation, they are residing together as a family again. Some inter-familial conflict, discussing conflict resolution strategies. Individual psychotherapy was provided utilizing solution focused techniques to restore adaptive functioning, provide symptom relief, discuss values and strengths, manage stress, identify triggers, recognize patterns of behavior, acknowledge sources of feelings and behaviors, assess symptoms, provide support, and discuss interpersonal conflicts. The therapeutic alliance was strengthened to encourage the patient to express their thoughts and feelings.   Continue  buspar 5 mg three times daily as needed for anxiety  Continue Viibryd 40 mg daily  Continue Lunesta 2 mg at bedtime  Discontinue bupropion XL   Follow up 3 weeks    04/11/2024 Patient is taking medications as prescribed and is tolerating them well.   He and his wife have decided to move forward with marriage reconciliation. They are reuniting the family back into one home. He has exhibited tremendous growth and self-reflection throughout this process. However, he is yearning for guidance and resolution. He continues to cope very well and symptoms of depression, anxiety, and insomnia are well-controlled with current medication management.   Depression and anxiety  Visit Type: follow-up (Depression, MARGE, Insomnia)  Patient presents with the following symptoms: excessive worry, feelings of hopelessness, muscle tension, nervousness/anxiety, psychomotor agitation and restlessness.  Patient is not experiencing: suicidal ideas, suicidal planning and thoughts of death.  Frequency of symptoms: occasionally  Severity: moderate  Sleep quality: good (Insomnia is well controlled with Lunesta)  Denies suicidal ideation.  Denies AVH.  We will continue to monitor for mood, behavior, and safety.  Continue Viibryd 40 mg daily  Continue Lunesta 2 mg at bedtime  Continue bupropion  mg daily  Start buspar 5 mg three times daily as needed for anxiety  Follow up 3 weeks    Record Review is below for 04/11/2024 :   POC Urine Drug Screen, Triage (05/19/2023 12:30)  No current or pertinent labs, imaging, procedures in record  EKG Results:  None in record  Head Imaging:  None in record    02/22/2024 Patient has discontinued wellbutrin, was wanting to decrease amount of medications.   Since our last encounter, wife has retracted filing for dissolution of marriage and is requesting reconciliation.   There has been, over the previous year, significant damage/harm done to their marriage contract. They have been experiencing irreconcilable  differences in morality, trust, and infidelity. He is experiencing intense ambivalence currently regarding any reconciliation. This is causing increased anxiety and stress. We are working through the   potential outcomes of any decisions made in response to her request. He has exhibited tremendous growth and self-reflection throughout this process. However, he is yearning for guidance and resolution.   Despite his current situation, he is coping very well and symptoms of depression, anxiety, and insomnia are well-controlled with current medication management.   Depression and anxiety  Visit Type: follow-up (Depression, MARGE, Insomnia)  Patient presents with the following symptoms: excessive worry, feelings of hopelessness, muscle tension, nervousness/anxiety, psychomotor agitation and restlessness.  Patient is not experiencing: suicidal ideas, suicidal planning and thoughts of death.  Frequency of symptoms: occasionally  Severity: moderate  Sleep quality: good (Insomnia is well controlled with Lunesta)  Denies suicidal ideation.  Denies AVH.  We will continue to monitor for mood, behavior, and safety.  Continue Viibryd 40 mg daily  Continue Lunesta 2 mg at bedtime  Continue bupropion  mg daily  Follow up 3 weeks    Record Review is below for 02/22/2024 :   POC Urine Drug Screen, Triage (05/19/2023 12:30)  No current or pertinent labs, imaging, procedures in record  EKG Results:  None in record  Head Imaging:  None in record      01/11/2024 Patient is taking medications as prescribed and is tolerating them well.   Wife has filed for dissolution of marriage, will continue to be a lengthy and difficult process until agreements have been completed. Despite this entire process, he seems to be coping and moving through this with healthy boundaries and coping/defense mechanisms.   He has been performing intense self-reflection and cognitive behaviors have greatly improved since our initial evaluation.   Depression,  anxiety, and insomnia are well-controlled with current medication management.   Depression  Visit Type: follow-up (Depression, MARGE, Insomnia)  Patient presents with the following symptoms: excessive worry, feelings of hopelessness, muscle tension, nervousness/anxiety, psychomotor agitation and restlessness.  Patient is not experiencing: suicidal ideas, suicidal planning and thoughts of death.  Frequency of symptoms: occasionally  Severity: moderate  Sleep quality: good (Insomnia is well controlled with Lunesta)  Denies suicidal ideation.  Denies AVH.  We will continue to monitor for mood, behavior, and safety.  Continue Viibryd 40 mg daily  Continue Lunesta 2 mg at bedtime  Continue bupropion  mg daily  Follow up 3 weeks    Record Review is below for 01/11/2024 :   POC Urine Drug Screen, Triage (05/19/2023 12:30)  No current or pertinent labs, imaging, procedures in record  EKG Results:  None in record  Head Imaging:  None in record    12/07/2023 Patient is taking medications as prescribed and is tolerating them well.   He has made a more resolute decision to move forward with dissolution of marriage. This is going to be a long and difficult process related to estate and custody arrangements.   Despite this entire process, he seems to be coping and moving through this with healthy boundaries and coping/defense mechanisms.   Depression, anxiety, and insomnia are well-controlled with current medication management.   Depression  Visit Type: follow-up (Depression, MARGE, Insomnia)  Patient presents with the following symptoms: excessive worry, feelings of hopelessness, muscle tension, nervousness/anxiety, psychomotor agitation and restlessness.  Patient is not experiencing: suicidal ideas, suicidal planning and thoughts of death.  Frequency of symptoms: occasionally  Severity: moderate  Sleep quality: good (Insomnia is well controlled with Lunesta)  Denies suicidal ideation.  Denies AVH.  We will continue to monitor  for mood, behavior, and safety.  Continue Viibryd 40 mg daily  Continue Lunesta 2 mg at bedtime  Continue bupropion  mg daily  Follow up 3 weeks    Record Review is below for 12/07/2023 :   POC Urine Drug Screen, Triage (05/19/2023 12:30)  No current or pertinent labs, imaging, procedures in record  EKG Results:  None in record  Head Imaging:  None in record    11/10/2023 Patient is taking medications as prescribed and is tolerating them well.   He and his wife both had individual sessions and more sessions with marriage therapist.   It appears he is moving more towards a decision of dissolving marriage. There have been some apparent and incongruent behaviors by his wife during this process that have caused him alarm.  There is remains some uncertainty about whether or not they are working on their marriage or rather moving towards divorce.  Despite this entire process, he seems to be coping and moving through this with healthy boundaries and coping/defense mechanisms.   Depression, anxiety, and insomnia are well-controlled with current medication management.   Depression  Visit Type: follow-up (Depression, MARGE, Insomnia)  Patient presents with the following symptoms: excessive worry, feelings of hopelessness, muscle tension, nervousness/anxiety, psychomotor agitation and restlessness.  Patient is not experiencing: suicidal ideas, suicidal planning and thoughts of death.  Frequency of symptoms: occasionally  Severity: moderate  Sleep quality: good (Insomnia is well controlled with Lunesta)  Denies suicidal ideation.  Denies AVH.  We will continue to monitor for mood, behavior, and safety.  Continue Viibryd 40 mg daily  Continue Lunesta 2 mg at bedtime  Continue bupropion  mg daily  Follow up 3 weeks    Record Review is below for 11/10/2023 : I have thoroughly reviewed the patient's electronic medical record to include previous encounters, care everywhere, notes, medications, labs, ELENA and UDS (if  applicable), imaging, and EKG's.  Pertinent information is included in this note.  POC Urine Drug Screen, Triage (05/19/2023 12:30)  No current or pertinent labs, imaging, procedures in record  EKG Results:  None in record  Head Imaging:  None in record      10/20/2023 Patient is taking medications as prescribed and is tolerating them well.   He and his wife both had individual sessions and a session with marriage therapist.   Things are moving along with therapy, however, there is still uncertainty about whether or not they are working on their marriage or rather moving towards divorce.  They continue to be .  She is living in her own apartment.  He expresses very logical and rational response to this situation, he appears to be coping very well despite this intense stressor.  Depression and anxiety are well controlled with current medications.  Depression  Visit Type: follow-up (Depression, MARGE, Insomnia)  Patient presents with the following symptoms: excessive worry, feelings of hopelessness, muscle tension, nervousness/anxiety, psychomotor agitation and restlessness.  Patient is not experiencing: suicidal ideas, suicidal planning and thoughts of death.  Frequency of symptoms: occasionally   Severity: moderate   Sleep quality: good (Insomnia is well controlled with Lunesta)  Denies suicidal ideation.  Denies AVH.  We will continue to monitor for mood, behavior, and safety.  Continue Viibryd 40 mg daily  Continue Lunesta 2 mg at bedtime  Continue bupropion  mg daily  Follow up 3 weeks    Record Review is below for 10/20/2023 : I have thoroughly reviewed the patient's electronic medical record to include previous encounters, care everywhere, notes, medications, labs, ELENA and UDS (if applicable), imaging, and EKG's.  Pertinent information is included in this note.  POC Urine Drug Screen, Triage (05/19/2023 12:30)  No current or pertinent labs, imaging, procedures in record  EKG Results:  None in  record  Head Imaging:  None in record      09/21/2023 Patient is taking medications as prescribed and is tolerating them well.   They had an initial evaluation with the marriage therapist.   He is hopeful by the fact that she has attended the sessions.  After discussion, it seems that there may be some resolution after few follow-up visits.  They are both going to have single sessions with the therapist before having another couples session.  Depression  Visit Type: follow-up (Depression, MARGE, Insomnia)  Patient presents with the following symptoms: excessive worry, feelings of hopelessness, muscle tension, nervousness/anxiety, psychomotor agitation and restlessness.  Patient is not experiencing: suicidal ideas, suicidal planning and thoughts of death.  Frequency of symptoms: occasionally   Severity: moderate   Sleep quality: good (Insomnia is well controlled with Lunesta)  Denies suicidal ideation.  Denies AVH.  We will continue to monitor for mood, behavior, and safety.  Continue Viibryd 40 mg daily  Continue Lunesta 2 mg at bedtime  Continue bupropion  mg daily  Follow up 2 weeks    Record Review is below for 09/21/2023 : I have thoroughly reviewed the patient's electronic medical record to include previous encounters, care everywhere, notes, medications, labs, ELENA and UDS (if applicable), imaging, and EKG's.  Pertinent information is included in this note.  POC Urine Drug Screen, Triage (05/19/2023 12:30)  No current or pertinent labs, imaging, procedures in record  EKG Results:  None in record  Head Imaging:  None in record    09/07/2023 Patient is taking medications as prescribed and is tolerating them well.   He and his wife are still officially . She is residing in her own apartment and they are sharing custody of their daughter every four days. They have MarriageTherapy next week, couples. He says she has agreed to attending.  Depression  Visit Type: follow-up (Depression, MARGE,  Insmonia)  Patient presents with the following symptoms: depressed mood, excessive worry, feelings of hopelessness, nervousness/anxiety and restlessness.  Patient is not experiencing: anhedonia, decreased concentration, fatigue, feelings of worthlessness, insomnia (Well controlled with Lunesta), suicidal ideas, suicidal planning and thoughts of death.  Frequency of symptoms: occasionally   Severity: moderate   Sleep quality: good  Compliance with medications:  %  Denies suicidal ideation.  Denies AVH.  We will continue to monitor for mood, behavior, and safety.  Continue Viibryd 40 mg daily  Continue Lunesta 2 mg at bedtime  Continue bupropion  mg daily  Follow up 2 weeks    Record Review is below for 09/07/2023 : I have thoroughly reviewed the patient's electronic medical record to include previous encounters, care everywhere, notes, medications, labs, ELENA and UDS (if applicable), imaging, and EKG's.  Pertinent information is included in this note.  POC Urine Drug Screen, Triage (05/19/2023 12:30)  No current or pertinent labs, imaging, procedures in record  EKG Results:  None in record  Head Imaging:  None in record    08/24/2023 Patient is taking medications as prescribed and is tolerating them well.  Patient is wife are still planning to attend marriage counseling in September.  Patient's wife has moved out into her own apartment.  They have currently decided on an arrangement of custody for their daughter every 4 days transition.  There are still some issues with communication and trust.  We continue to discuss effective boundary setting while also communicating effectively.  Depression  Visit Type: follow-up (Depression, MARGE, Insomnia)  Patient presents with the following symptoms: excessive worry, feelings of hopelessness, memory impairment, muscle tension, nervousness/anxiety and restlessness.  Patient is not experiencing: anhedonia, decreased concentration, depressed mood, fatigue, insomnia,  suicidal ideas, suicidal planning and thoughts of death.  Frequency of symptoms: most days   Severity: causing significant distress   Sleep quality: good (Insomnia is well-controlled with Lunesta)  Denies suicidal ideation.  Denies AVH.  We will continue to monitor for mood, behavior, and safety.  Continue Viibryd 40 mg daily  Continue Lunesta 2 mg at bedtime  Continue bupropion  mg daily  Follow up 2 weeks    Record Review is below for 08/24/2023 : I have thoroughly reviewed the patient's electronic medical record to include previous encounters, care everywhere, notes, medications, labs, ELENA and UDS (if applicable), imaging, and EKG's.  Pertinent information is included in this note.  POC Urine Drug Screen, Triage (05/19/2023 12:30)  No current or pertinent labs, imaging, procedures in record  EKG Results:  None in record  Head Imaging:  None in record      08/03/2023 Patient is taking medications as prescribed and is tolerating them well.   Going to initiate marriage counseling in September, she is still moving out and will be in her own apartment.  He is asking about possibly discontinuing bupropion XL.  He does believe this medication has helped but he does not want to continue to take multiple medications.  We are in agreement that we will wait until some of these other stressors have been resolved.  Depression and anxiety symptoms are well controlled with Viibryd and bupropion XL.  Insomnia is well controlled with Lunesta.  He enjoys therapy with me and we are continuing to work on setting firm boundaries, improving communication techniques.    Denies suicidal ideation.  Denies AVH.  We will continue to monitor for mood, behavior, and safety.  Continue Viibryd 40 mg daily  Continue Lunesta 2 mg at bedtime  Continue bupropion  mg daily  Follow up 2 weeks    Record Review is below for 08/03/2023 : I have thoroughly reviewed the patient's electronic medical record to include previous encounters,  care everywhere, notes, medications, labs, ELENA and UDS (if applicable), imaging, and EKG's.  Pertinent information is included in this note.  POC Urine Drug Screen, Triage (05/19/2023 12:30)  No current or pertinent labs, imaging, procedures in record  EKG Results:  None in record  Head Imaging:  None in record      07/20/2023 Patient is taking medications as prescribed and is tolerating them well.   Since our last encounter, patient and his wife are going to be further , she is planning to move out of their house and into her own apartment. This continues to be a situational stressor for him, he is ultimately concerned with how this is going to affect their youngest child.   He reports that depression and anxiety are well controlled with Viibryd and bupropion XL. Reports increased energy and improved mood.  Sleep: Insomnia is well controlled with Lunesta  He enjoys therapy with me and we are continuing to work on setting firm boundaries, improving communication techniques.  We again discussed my intense recommendation that he and his wife begin marriage counseling/therapy.   Denies suicidal ideation.  Denies AVH.  We will continue to monitor for mood, behavior, and safety.  Continue Viibryd 40 mg daily  Continue Lunesta 2 mg at bedtime  Continue bupropion  mg daily  Follow up 2 weeks    Record Review is below for 07/20/2023 : I have thoroughly reviewed the patient's electronic medical record to include previous encounters, care everywhere, notes, medications, labs, ELENA and UDS (if applicable), imaging, and EKG's.  Pertinent information is included in this note.  POC Urine Drug Screen, Triage (05/19/2023 12:30)  No current or pertinent labs, imaging, procedures in record  EKG Results:  None in record  Head Imaging:  None in record    07/06/2023 Patient is taking medications as prescribed and is tolerating them well.   Patient continues to see some improvement in his relationship.  Continue to  improve communication.  He reports that depression and anxiety are well controlled with Viibryd and bupropion XL.  Sleep: Insomnia is well controlled with Lunesta, he reports by sleep he has had in a long time.  He enjoys therapy with me and we are continuing to work on setting firm boundaries, improving communication techniques.  We again discussed my intense recommendation that he and his wife begin marriage counseling/therapy.   Denies suicidal ideation.  Denies AVH.  We will continue to monitor for mood, behavior, and safety.  Continue Viibryd 40 mg daily  Continue Lunesta 2 mg at bedtime  Continue bupropion  mg daily  Follow up 2 weeks    Record Review is below for 07/06/2023 : I have thoroughly reviewed the patient's electronic medical record to include previous encounters, care everywhere, notes, medications, labs, ELENA and UDS (if applicable), imaging, and EKG's.  Pertinent information is included in this note.  POC Urine Drug Screen, Triage (05/19/2023 12:30)  No current or pertinent labs, imaging, procedures in record  EKG Results:  None in record    06/23/2023 Patient is taking medications as prescribed and is tolerating them well.   Patient states that he and his wife have had a good couple weeks and have improved in communication.  There is less tension.  They have been spending a little bit more time together.  Patient still reports improved mood, energy, and decreased anxiety.  Sleep: Insomnia is well controlled with Lunesta  Denies suicidal ideation.  Denies AVH.  We will continue to monitor for mood, behavior, and safety.  Continue Viibryd 40 mg daily  Continue Lunesta 2 mg at bedtime  Continue bupropion  mg daily  Follow up 2 weeks    Record Review is below for 06/23/2023 : I have thoroughly reviewed the patient's electronic medical record to include previous encounters, care everywhere, notes, medications, labs, ELENA and UDS (if applicable), imaging, and EKG's.  Pertinent  information is included in this note.  POC Urine Drug Screen, Triage (05/19/2023 12:30)  No current or pertinent labs, imaging, procedures in record  EKG Results:  None in record    06/16/2023 Patient is taking medications as prescribed and is tolerating them well.   Feeling better since adding wellbutrin XL, improved mood and energy, decreased anxiety  Sleep: Insomnia is controlled with Lunesta  Having more conversations with his wife, working on communication. Discussing seeking marriage counseling.   Denies suicidal ideation.  Denies AVH.  We will continue to monitor for mood, behavior, and safety.  Continue Viibryd 40 mg daily  Continue Lunesta 2 mg at bedtime  Continue bupropion  mg daily  Follow up 2 weeks    Record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

## 2024-12-15 NOTE — PATIENT INSTRUCTIONS
1.  Please return to clinic at your next scheduled visit.  Please contact the clinic ([phone]) at least 24 hours prior in the event you need to cancel.  2.  Do no harm to yourself or others.    3.  Avoid alcohol and drugs.    4.  Take all medications as prescribed.  Please contact the clinic with any concerns. If you are in need of medication refills, please call the clinic at [phone].    5. Should you want to get in touch with your provider, ARMAND Loredo, please contact the office ([phone]), and staff will be able to page Opal directly.  6. In the event you have personal crisis, contact the following crisis numbers: Suicide Prevention Hotline 1-287.566.2989; [facility] Helpline 0-639-311-MSEO; [facility] [phone]; text HELLO to 651096; or 641.     SPECIFIC RECOMMENDATIONS:     1.      Medications discussed at this encounter:     No orders of the defined types were placed in this encounter.                      2.      Psychotherapy recommendations: We will continue therapy at future visits.     3.     Return to clinic: Return in about 2 months (around 2/5/2025).

## 2024-12-16 DIAGNOSIS — F41.1 GENERALIZED ANXIETY DISORDER: ICD-10-CM

## 2024-12-16 DIAGNOSIS — F51.01 PRIMARY INSOMNIA: ICD-10-CM

## 2024-12-16 DIAGNOSIS — F33.1 MODERATE EPISODE OF RECURRENT MAJOR DEPRESSIVE DISORDER: ICD-10-CM

## 2024-12-16 RX ORDER — ESZOPICLONE 2 MG/1
TABLET, FILM COATED ORAL
Qty: 30 TABLET | Refills: 2 | Status: SHIPPED | OUTPATIENT
Start: 2024-12-24

## 2024-12-16 RX ORDER — VILAZODONE HYDROCHLORIDE 40 MG/1
40 TABLET ORAL DAILY
Qty: 30 TABLET | Refills: 1 | Status: SHIPPED | OUTPATIENT
Start: 2024-12-16

## 2024-12-16 RX ORDER — BUSPIRONE HYDROCHLORIDE 5 MG/1
5 TABLET ORAL 3 TIMES DAILY
Qty: 90 TABLET | Refills: 1 | Status: SHIPPED | OUTPATIENT
Start: 2024-12-16

## 2024-12-16 NOTE — TELEPHONE ENCOUNTER
MEDICATION: busPIRone (BUSPAR) 5 MG tablet (10/18/2024)     vilazodone (VIIBRYD) 40 MG tablet tablet (10/18/2024)     eszopiclone (LUNESTA) 2 MG tablet (09/11/2024)      NEXT OFFICE VISIT: Appointment with Opal Huang APRN (02/20/2025)     LAST OFFICE VISIT: Office Visit with Opal Huang APRN (12/05/2024)     NARC CONSENT: CONTROLLED SUBSTANCE AGREEMENT - SCAN - BEHAVIORAL HEALTH CONTROLLED SUBSTANCE (06/06/2024)     URINE DRUG SCREEN(STANDING ORDER) POC Medline 12 Panel Urine Drug Screen (06/06/2024 15:09)       PROVIDER PLEASE ADVISE

## 2025-02-13 DIAGNOSIS — F33.1 MODERATE EPISODE OF RECURRENT MAJOR DEPRESSIVE DISORDER: ICD-10-CM

## 2025-02-13 DIAGNOSIS — F41.1 GENERALIZED ANXIETY DISORDER: ICD-10-CM

## 2025-02-13 RX ORDER — VILAZODONE HYDROCHLORIDE 40 MG/1
40 TABLET ORAL DAILY
Qty: 30 TABLET | Refills: 1 | Status: SHIPPED | OUTPATIENT
Start: 2025-02-13

## 2025-02-13 RX ORDER — BUSPIRONE HYDROCHLORIDE 5 MG/1
5 TABLET ORAL 3 TIMES DAILY
Qty: 90 TABLET | Refills: 1 | Status: SHIPPED | OUTPATIENT
Start: 2025-02-13

## 2025-02-13 NOTE — TELEPHONE ENCOUNTER
REFILL REQUEST:     vilazodone (VIIBRYD) 40 MG tablet tablet (12/16/2024)     busPIRone (BUSPAR) 5 MG tablet (12/16/2024)     F/UP- 02/20/2025.  LOV: 12/05/2024.

## 2025-03-13 DIAGNOSIS — F51.01 PRIMARY INSOMNIA: ICD-10-CM

## 2025-03-13 RX ORDER — ESZOPICLONE 2 MG/1
2 TABLET, FILM COATED ORAL NIGHTLY
Qty: 30 TABLET | Refills: 2 | Status: SHIPPED | OUTPATIENT
Start: 2025-03-19

## 2025-03-13 NOTE — TELEPHONE ENCOUNTER
eszopiclone 2 mg tablet     Last ordered: 2 months ago (12/16/2024) by ARMAND Loredo     FOLLOW UP 04/03/2025

## 2025-04-03 ENCOUNTER — OFFICE VISIT (OUTPATIENT)
Dept: BEHAVIORAL HEALTH | Facility: CLINIC | Age: 40
End: 2025-04-03
Payer: COMMERCIAL

## 2025-04-03 VITALS
SYSTOLIC BLOOD PRESSURE: 123 MMHG | WEIGHT: 190 LBS | BODY MASS INDEX: 25.18 KG/M2 | HEIGHT: 73 IN | HEART RATE: 65 BPM | DIASTOLIC BLOOD PRESSURE: 80 MMHG

## 2025-04-03 DIAGNOSIS — F51.01 PRIMARY INSOMNIA: ICD-10-CM

## 2025-04-03 DIAGNOSIS — F33.1 MODERATE EPISODE OF RECURRENT MAJOR DEPRESSIVE DISORDER: Primary | ICD-10-CM

## 2025-04-03 DIAGNOSIS — F41.1 GENERALIZED ANXIETY DISORDER: ICD-10-CM

## 2025-04-03 RX ORDER — AMOXICILLIN 500 MG/1
CAPSULE ORAL
COMMUNITY
Start: 2025-02-27

## 2025-04-03 RX ORDER — BUSPIRONE HYDROCHLORIDE 5 MG/1
5 TABLET ORAL 3 TIMES DAILY
Qty: 90 TABLET | Refills: 1 | Status: SHIPPED | OUTPATIENT
Start: 2025-04-03

## 2025-04-03 RX ORDER — ESZOPICLONE 3 MG/1
3 TABLET, FILM COATED ORAL NIGHTLY
Qty: 30 TABLET | Refills: 0 | Status: SHIPPED | OUTPATIENT
Start: 2025-04-23

## 2025-04-03 RX ORDER — VILAZODONE HYDROCHLORIDE 40 MG/1
40 TABLET ORAL DAILY
Qty: 30 TABLET | Refills: 1 | Status: SHIPPED | OUTPATIENT
Start: 2025-04-03

## 2025-04-03 RX ORDER — HYDROCODONE BITARTRATE AND ACETAMINOPHEN 5; 325 MG/1; MG/1
1 TABLET ORAL SEE ADMIN INSTRUCTIONS
COMMUNITY
Start: 2025-02-27

## 2025-04-03 NOTE — PROGRESS NOTES
"[facility]  Medication Management Follow-up      Record Review is below for 04/03/2025 :   No current or pertinent labs, imaging, procedures in record  EKG Results:  None in record  Head Imaging:  None in record  Vital Signs:   /80   Pulse 65   Ht 185.4 cm (72.99\")   Wt 86.2 kg (190 lb)   BMI 25.07 kg/m²     Chief Complaint: Depression. Anxiety. Insomnia.     History of Present Illness:   Chet Cabrera is a 40 y.o. male who presents today for follow-up and medication management for:    ICD-10-CM ICD-9-CM   1. Moderate episode of recurrent major depressive disorder  F33.1 296.32   2. Generalized anxiety disorder  F41.1 300.02   3. Primary insomnia  F51.01 307.42       04/03/2025 Patient is taking medications as prescribed and is tolerating them well.   Depression and Anxiety  Progression of symptoms, frequency, and intensity is improving. Patient continues to experience psychomotor agitation, excessive anxiety and worry, anxiety, difficulty controlling the worry, restlessness, PHQ-9 is 1and MARGE-7 is 0. and these symptoms are causing significant distress or impairment. Patient denies feeling worthless, guilty, hopelessness,. Denies thinking about death and dying, suicidal ideation, planning, or intent to self-harm.  Denies AVH.  Clinically significant distress or impairment in social, occupational, or other important areas of functioning is improving.  Insomnia  Progression of symptoms, frequency, and intensity is stable, medication efficacy noted, and well-controlled with current medications Lunesta .   CBT/Supportive  Allowed patient to process topics such as things have improved greatly in marital relationship, continues to make progress. Individual psychotherapy was provided utilizing solution focused techniques to restore adaptive functioning, provide symptom relief, discuss values and strengths, manage stress, identify triggers, recognize patterns of behavior, acknowledge " sources of feelings and behaviors, assess symptoms, provide support, and discuss interpersonal conflicts. The therapeutic alliance was strengthened to encourage the patient to express their thoughts and feelings.     12/05/2024 Patient is taking medications as prescribed and is tolerating them well.   Depression and Anxiety  Progression of symptoms, frequency, and intensity is waxing and waning but better overall. Patient continues to experience excessive anxiety and worry, anxiety, difficulty controlling the worry, and these symptoms are causing significant distress or impairment. Patient denies feelings of sadness, low mood, feeling worthless, guilty, hopelessness,. Denies thinking about death and dying, suicidal ideation, planning, or intent to self-harm.  Denies AVH.  Clinically significant distress or impairment in social, occupational, or other important areas of functioning is waxing and waning but better overall.  Insomnia  Progression of symptoms, frequency, and intensity is improving. Patient experiences challenges difficulty falling asleep (onset insomnia) with inability to fall asleep beyond 20-30 minutes, which occurs even under ideal circumstances.   CBT/Supportive  Allowed patient to process topics such as the presence of marriage reconciliation has slightly improved, continues with some communication challenges. Individual psychotherapy was provided utilizing solution focused techniques to restore adaptive functioning, provide symptom relief, discuss values and strengths, manage stress, identify triggers, recognize patterns of behavior, acknowledge sources of feelings and behaviors, assess symptoms, provide support, and discuss interpersonal conflicts. The therapeutic alliance was strengthened to encourage the patient to express their thoughts and feelings.   Continue buspar 5 mg three times daily as needed for anxiety  Continue Viibryd 40 mg daily  Continue Lunesta 2 mg at bedtime  Follow up 3  months    06/06/2024 Patient is taking medications as prescribed and is tolerating them well.   Depression and Anxiety  Progression of symptoms, frequency, and intensity is gradually improving. Patient continues to experience low mood, psychomotor agitation, anxiety, restlessness, feeling keyed up or on edge, sleep disturbance, and these symptoms are causing significant distress or impairment. Patient denies feeling worthless, guilty, hopelessness,. Denies thinking about death and dying, suicidal ideation, planning, or intent to self-harm.  Denies AVH.  Clinically significant distress or impairment in social, occupational, or other important areas of functioning is improving.  Insomnia  Is sometimes able to abstain from using lunesta for insomnia and get a good night's sleep. Progression of symptoms, frequency, and intensity is stable, medication efficacy noted, and well-controlled with current medications.   CBT/Supportive  Allowed patient to process topics such as continues the process of marriage reconciliation, they are residing together as a family again. Some inter-familial conflict, discussing conflict resolution strategies. Individual psychotherapy was provided utilizing solution focused techniques to restore adaptive functioning, provide symptom relief, discuss values and strengths, manage stress, identify triggers, recognize patterns of behavior, acknowledge sources of feelings and behaviors, assess symptoms, provide support, and discuss interpersonal conflicts. The therapeutic alliance was strengthened to encourage the patient to express their thoughts and feelings.   Continue buspar 5 mg three times daily as needed for anxiety  Continue Viibryd 40 mg daily  Continue Lunesta 2 mg at bedtime  Discontinue bupropion XL   Follow up 3 weeks    04/11/2024 Patient is taking medications as prescribed and is tolerating them well.   He and his wife have decided to move forward with marriage reconciliation. They are  reuniting the family back into one home. He has exhibited tremendous growth and self-reflection throughout this process. However, he is yearning for guidance and resolution. He continues to cope very well and symptoms of depression, anxiety, and insomnia are well-controlled with current medication management.   Depression and anxiety  Visit Type: follow-up (Depression, MARGE, Insomnia)  Patient presents with the following symptoms: excessive worry, feelings of hopelessness, muscle tension, nervousness/anxiety, psychomotor agitation and restlessness.  Patient is not experiencing: suicidal ideas, suicidal planning and thoughts of death.  Frequency of symptoms: occasionally  Severity: moderate  Sleep quality: good (Insomnia is well controlled with Lunesta)  Denies suicidal ideation.  Denies AVH.  We will continue to monitor for mood, behavior, and safety.  Continue Viibryd 40 mg daily  Continue Lunesta 2 mg at bedtime  Continue bupropion  mg daily  Start buspar 5 mg three times daily as needed for anxiety  Follow up 3 weeks    Record Review is below for 04/11/2024 :   POC Urine Drug Screen, Triage (05/19/2023 12:30)  No current or pertinent labs, imaging, procedures in record  EKG Results:  None in record  Head Imaging:  None in record    02/22/2024 Patient has discontinued wellbutrin, was wanting to decrease amount of medications.   Since our last encounter, wife has retracted filing for dissolution of marriage and is requesting reconciliation.   There has been, over the previous year, significant damage/harm done to their marriage contract. They have been experiencing irreconcilable differences in morality, trust, and infidelity. He is experiencing intense ambivalence currently regarding any reconciliation. This is causing increased anxiety and stress. We are working through the   potential outcomes of any decisions made in response to her request. He has exhibited tremendous growth and self-reflection throughout  this process. However, he is yearning for guidance and resolution.   Despite his current situation, he is coping very well and symptoms of depression, anxiety, and insomnia are well-controlled with current medication management.   Depression and anxiety  Visit Type: follow-up (Depression, MARGE, Insomnia)  Patient presents with the following symptoms: excessive worry, feelings of hopelessness, muscle tension, nervousness/anxiety, psychomotor agitation and restlessness.  Patient is not experiencing: suicidal ideas, suicidal planning and thoughts of death.  Frequency of symptoms: occasionally  Severity: moderate  Sleep quality: good (Insomnia is well controlled with Lunesta)  Denies suicidal ideation.  Denies AVH.  We will continue to monitor for mood, behavior, and safety.  Continue Viibryd 40 mg daily  Continue Lunesta 2 mg at bedtime  Continue bupropion  mg daily  Follow up 3 weeks    Record Review is below for 02/22/2024 :   POC Urine Drug Screen, Triage (05/19/2023 12:30)  No current or pertinent labs, imaging, procedures in record  EKG Results:  None in record  Head Imaging:  None in record      01/11/2024 Patient is taking medications as prescribed and is tolerating them well.   Wife has filed for dissolution of marriage, will continue to be a lengthy and difficult process until agreements have been completed. Despite this entire process, he seems to be coping and moving through this with healthy boundaries and coping/defense mechanisms.   He has been performing intense self-reflection and cognitive behaviors have greatly improved since our initial evaluation.   Depression, anxiety, and insomnia are well-controlled with current medication management.   Depression  Visit Type: follow-up (Depression, MARGE, Insomnia)  Patient presents with the following symptoms: excessive worry, feelings of hopelessness, muscle tension, nervousness/anxiety, psychomotor agitation and restlessness.  Patient is not experiencing:  suicidal ideas, suicidal planning and thoughts of death.  Frequency of symptoms: occasionally  Severity: moderate  Sleep quality: good (Insomnia is well controlled with Lunesta)  Denies suicidal ideation.  Denies AVH.  We will continue to monitor for mood, behavior, and safety.  Continue Viibryd 40 mg daily  Continue Lunesta 2 mg at bedtime  Continue bupropion  mg daily  Follow up 3 weeks    Record Review is below for 01/11/2024 :   POC Urine Drug Screen, Triage (05/19/2023 12:30)  No current or pertinent labs, imaging, procedures in record  EKG Results:  None in record  Head Imaging:  None in record    12/07/2023 Patient is taking medications as prescribed and is tolerating them well.   He has made a more resolute decision to move forward with dissolution of marriage. This is going to be a long and difficult process related to estate and custody arrangements.   Despite this entire process, he seems to be coping and moving through this with healthy boundaries and coping/defense mechanisms.   Depression, anxiety, and insomnia are well-controlled with current medication management.   Depression  Visit Type: follow-up (Depression, MARGE, Insomnia)  Patient presents with the following symptoms: excessive worry, feelings of hopelessness, muscle tension, nervousness/anxiety, psychomotor agitation and restlessness.  Patient is not experiencing: suicidal ideas, suicidal planning and thoughts of death.  Frequency of symptoms: occasionally  Severity: moderate  Sleep quality: good (Insomnia is well controlled with Lunesta)  Denies suicidal ideation.  Denies AVH.  We will continue to monitor for mood, behavior, and safety.  Continue Viibryd 40 mg daily  Continue Lunesta 2 mg at bedtime  Continue bupropion  mg daily  Follow up 3 weeks    Record Review is below for 12/07/2023 :   POC Urine Drug Screen, Triage (05/19/2023 12:30)  No current or pertinent labs, imaging, procedures in record  EKG Results:  None in  record  Head Imaging:  None in record    11/10/2023 Patient is taking medications as prescribed and is tolerating them well.   He and his wife both had individual sessions and more sessions with marriage therapist.   It appears he is moving more towards a decision of dissolving marriage. There have been some apparent and incongruent behaviors by his wife during this process that have caused him alarm.  There is remains some uncertainty about whether or not they are working on their marriage or rather moving towards divorce.  Despite this entire process, he seems to be coping and moving through this with healthy boundaries and coping/defense mechanisms.   Depression, anxiety, and insomnia are well-controlled with current medication management.   Depression  Visit Type: follow-up (Depression, MARGE, Insomnia)  Patient presents with the following symptoms: excessive worry, feelings of hopelessness, muscle tension, nervousness/anxiety, psychomotor agitation and restlessness.  Patient is not experiencing: suicidal ideas, suicidal planning and thoughts of death.  Frequency of symptoms: occasionally  Severity: moderate  Sleep quality: good (Insomnia is well controlled with Lunesta)  Denies suicidal ideation.  Denies AVH.  We will continue to monitor for mood, behavior, and safety.  Continue Viibryd 40 mg daily  Continue Lunesta 2 mg at bedtime  Continue bupropion  mg daily  Follow up 3 weeks    Record Review is below for 11/10/2023 : I have thoroughly reviewed the patient's electronic medical record to include previous encounters, care everywhere, notes, medications, labs, ELENA and UDS (if applicable), imaging, and EKG's.  Pertinent information is included in this note.  POC Urine Drug Screen, Triage (05/19/2023 12:30)  No current or pertinent labs, imaging, procedures in record  EKG Results:  None in record  Head Imaging:  None in record      10/20/2023 Patient is taking medications as prescribed and is tolerating  them well.   He and his wife both had individual sessions and a session with marriage therapist.   Things are moving along with therapy, however, there is still uncertainty about whether or not they are working on their marriage or rather moving towards divorce.  They continue to be .  She is living in her own apartment.  He expresses very logical and rational response to this situation, he appears to be coping very well despite this intense stressor.  Depression and anxiety are well controlled with current medications.  Depression  Visit Type: follow-up (Depression, MARGE, Insomnia)  Patient presents with the following symptoms: excessive worry, feelings of hopelessness, muscle tension, nervousness/anxiety, psychomotor agitation and restlessness.  Patient is not experiencing: suicidal ideas, suicidal planning and thoughts of death.  Frequency of symptoms: occasionally   Severity: moderate   Sleep quality: good (Insomnia is well controlled with Lunesta)  Denies suicidal ideation.  Denies AVH.  We will continue to monitor for mood, behavior, and safety.  Continue Viibryd 40 mg daily  Continue Lunesta 2 mg at bedtime  Continue bupropion  mg daily  Follow up 3 weeks    Record Review is below for 10/20/2023 : I have thoroughly reviewed the patient's electronic medical record to include previous encounters, care everywhere, notes, medications, labs, ELENA and UDS (if applicable), imaging, and EKG's.  Pertinent information is included in this note.  POC Urine Drug Screen, Triage (05/19/2023 12:30)  No current or pertinent labs, imaging, procedures in record  EKG Results:  None in record  Head Imaging:  None in record      09/21/2023 Patient is taking medications as prescribed and is tolerating them well.   They had an initial evaluation with the marriage therapist.   He is hopeful by the fact that she has attended the sessions.  After discussion, it seems that there may be some resolution after few follow-up  visits.  They are both going to have single sessions with the therapist before having another couples session.  Depression  Visit Type: follow-up (Depression, MARGE, Insomnia)  Patient presents with the following symptoms: excessive worry, feelings of hopelessness, muscle tension, nervousness/anxiety, psychomotor agitation and restlessness.  Patient is not experiencing: suicidal ideas, suicidal planning and thoughts of death.  Frequency of symptoms: occasionally   Severity: moderate   Sleep quality: good (Insomnia is well controlled with Lunesta)  Denies suicidal ideation.  Denies AVH.  We will continue to monitor for mood, behavior, and safety.  Continue Viibryd 40 mg daily  Continue Lunesta 2 mg at bedtime  Continue bupropion  mg daily  Follow up 2 weeks    Record Review is below for 09/21/2023 : I have thoroughly reviewed the patient's electronic medical record to include previous encounters, care everywhere, notes, medications, labs, ELENA and UDS (if applicable), imaging, and EKG's.  Pertinent information is included in this note.  POC Urine Drug Screen, Triage (05/19/2023 12:30)  No current or pertinent labs, imaging, procedures in record  EKG Results:  None in record  Head Imaging:  None in record    09/07/2023 Patient is taking medications as prescribed and is tolerating them well.   He and his wife are still officially . She is residing in her own apartment and they are sharing custody of their daughter every four days. They have MarriageTherapy next week, couples. He says she has agreed to attending.  Depression  Visit Type: follow-up (Depression, MARGE, Insmonia)  Patient presents with the following symptoms: depressed mood, excessive worry, feelings of hopelessness, nervousness/anxiety and restlessness.  Patient is not experiencing: anhedonia, decreased concentration, fatigue, feelings of worthlessness, insomnia (Well controlled with Lunesta), suicidal ideas, suicidal planning and thoughts of  death.  Frequency of symptoms: occasionally   Severity: moderate   Sleep quality: good  Compliance with medications:  %  Denies suicidal ideation.  Denies AVH.  We will continue to monitor for mood, behavior, and safety.  Continue Viibryd 40 mg daily  Continue Lunesta 2 mg at bedtime  Continue bupropion  mg daily  Follow up 2 weeks    Record Review is below for 09/07/2023 : I have thoroughly reviewed the patient's electronic medical record to include previous encounters, care everywhere, notes, medications, labs, ELENA and UDS (if applicable), imaging, and EKG's.  Pertinent information is included in this note.  POC Urine Drug Screen, Triage (05/19/2023 12:30)  No current or pertinent labs, imaging, procedures in record  EKG Results:  None in record  Head Imaging:  None in record    08/24/2023 Patient is taking medications as prescribed and is tolerating them well.  Patient is wife are still planning to attend marriage counseling in September.  Patient's wife has moved out into her own apartment.  They have currently decided on an arrangement of custody for their daughter every 4 days transition.  There are still some issues with communication and trust.  We continue to discuss effective boundary setting while also communicating effectively.  Depression  Visit Type: follow-up (Depression, MARGE, Insomnia)  Patient presents with the following symptoms: excessive worry, feelings of hopelessness, memory impairment, muscle tension, nervousness/anxiety and restlessness.  Patient is not experiencing: anhedonia, decreased concentration, depressed mood, fatigue, insomnia, suicidal ideas, suicidal planning and thoughts of death.  Frequency of symptoms: most days   Severity: causing significant distress   Sleep quality: good (Insomnia is well-controlled with Lunesta)  Denies suicidal ideation.  Denies AVH.  We will continue to monitor for mood, behavior, and safety.  Continue Viibryd 40 mg daily  Continue Lunesta 2  mg at bedtime  Continue bupropion  mg daily  Follow up 2 weeks    Record Review is below for 08/24/2023 : I have thoroughly reviewed the patient's electronic medical record to include previous encounters, care everywhere, notes, medications, labs, ELENA and UDS (if applicable), imaging, and EKG's.  Pertinent information is included in this note.  POC Urine Drug Screen, Triage (05/19/2023 12:30)  No current or pertinent labs, imaging, procedures in record  EKG Results:  None in record  Head Imaging:  None in record      08/03/2023 Patient is taking medications as prescribed and is tolerating them well.   Going to initiate marriage counseling in September, she is still moving out and will be in her own apartment.  He is asking about possibly discontinuing bupropion XL.  He does believe this medication has helped but he does not want to continue to take multiple medications.  We are in agreement that we will wait until some of these other stressors have been resolved.  Depression and anxiety symptoms are well controlled with Viibryd and bupropion XL.  Insomnia is well controlled with Lunesta.  He enjoys therapy with me and we are continuing to work on setting firm boundaries, improving communication techniques.    Denies suicidal ideation.  Denies AVH.  We will continue to monitor for mood, behavior, and safety.  Continue Viibryd 40 mg daily  Continue Lunesta 2 mg at bedtime  Continue bupropion  mg daily  Follow up 2 weeks    Record Review is below for 08/03/2023 : I have thoroughly reviewed the patient's electronic medical record to include previous encounters, care everywhere, notes, medications, labs, ELENA and UDS (if applicable), imaging, and EKG's.  Pertinent information is included in this note.  POC Urine Drug Screen, Triage (05/19/2023 12:30)  No current or pertinent labs, imaging, procedures in record  EKG Results:  None in record  Head Imaging:  None in record      07/20/2023 Patient is taking  medications as prescribed and is tolerating them well.   Since our last encounter, patient and his wife are going to be further , she is planning to move out of their house and into her own apartment. This continues to be a situational stressor for him, he is ultimately concerned with how this is going to affect their youngest child.   He reports that depression and anxiety are well controlled with Viibryd and bupropion XL. Reports increased energy and improved mood.  Sleep: Insomnia is well controlled with Lunesta  He enjoys therapy with me and we are continuing to work on setting firm boundaries, improving communication techniques.  We again discussed my intense recommendation that he and his wife begin marriage counseling/therapy.   Denies suicidal ideation.  Denies AVH.  We will continue to monitor for mood, behavior, and safety.  Continue Viibryd 40 mg daily  Continue Lunesta 2 mg at bedtime  Continue bupropion  mg daily  Follow up 2 weeks    Record Review is below for 07/20/2023 : I have thoroughly reviewed the patient's electronic medical record to include previous encounters, care everywhere, notes, medications, labs, ELENA and UDS (if applicable), imaging, and EKG's.  Pertinent information is included in this note.  POC Urine Drug Screen, Triage (05/19/2023 12:30)  No current or pertinent labs, imaging, procedures in record  EKG Results:  None in record  Head Imaging:  None in record    07/06/2023 Patient is taking medications as prescribed and is tolerating them well.   Patient continues to see some improvement in his relationship.  Continue to improve communication.  He reports that depression and anxiety are well controlled with Viibryd and bupropion XL.  Sleep: Insomnia is well controlled with Lunesta, he reports by sleep he has had in a long time.  He enjoys therapy with me and we are continuing to work on setting firm boundaries, improving communication techniques.  We again discussed  my intense recommendation that he and his wife begin marriage counseling/therapy.   Denies suicidal ideation.  Denies AVH.  We will continue to monitor for mood, behavior, and safety.  Continue Viibryd 40 mg daily  Continue Lunesta 2 mg at bedtime  Continue bupropion  mg daily  Follow up 2 weeks    Record Review is below for 07/06/2023 : I have thoroughly reviewed the patient's electronic medical record to include previous encounters, care everywhere, notes, medications, labs, ELENA and UDS (if applicable), imaging, and EKG's.  Pertinent information is included in this note.  POC Urine Drug Screen, Triage (05/19/2023 12:30)  No current or pertinent labs, imaging, procedures in record  EKG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

## 2025-04-03 NOTE — PATIENT INSTRUCTIONS
1.  Please return to clinic at your next scheduled visit.  Please contact the clinic ([phone]) at least 24 hours prior in the event you need to cancel.  2.  Do no harm to yourself or others.    3.  Avoid alcohol and drugs.    4.  Take all medications as prescribed.  Please contact the clinic with any concerns. If you are in need of medication refills, please call the clinic at [phone].    5. Should you want to get in touch with your provider, ARMAND Loredo, please contact the office ([phone]), and staff will be able to page Opal directly.  6. In the event you have personal crisis, contact the following crisis numbers: Suicide Prevention Hotline 1-357.346.1856; [facility] Helpline 9-484-943-CRQK; [facility] Room [phone]; text HELLO to 326547; or 829.     SPECIFIC RECOMMENDATIONS:     1.      Medications discussed at this encounter:     New Medications Ordered This Visit   Medications    eszopiclone (LUNESTA) 3 MG tablet     Sig: Take 1 tablet by mouth Every Night.     Dispense:  30 tablet     Refill:  0     Increase dose. Thx 4 All U Do!                       2.      Psychotherapy recommendations: We will continue therapy at future visits.     3.     Return to clinic: Return in about 6 months (around 10/3/2025).

## 2025-04-09 NOTE — TREATMENT PLAN
Multi-Disciplinary Problems (from Behavioral Health Treatment Plan)      Active Problems       Problem: Anxiety  Start Date: 04/03/25      Problem Details: The patient self-scales this problem as a 8 with 10 being the worst.          Goal Priority Start Date Expected End Date End Date    Patient will develop and implement behavioral and cognitive strategies to reduce anxiety and irrational fears. -- 04/03/25 10/08/25 --    Goal Details: Functional status: If symptoms are present, they are transient and expectable reactions to psychosocial stressors; no more than slight impairment in social, occupational or school functioning (71-80)  Prognosis: Excellent   Progress: improving        Goal Intervention Frequency Start Date End Date    Help patient explore past emotional issues in relation to present anxiety. Q Month 04/03/25 --    Intervention Details: Duration of treatment until remission of symptoms.        Goal Intervention Frequency Start Date End Date    Help patient develop an awareness of their cognitive and physical responses to anxiety. Q Month 04/03/25 --    Intervention Details: Duration of treatment until remission of symptoms.                        Reviewed By       Opal Huang APRN 04/09/25 1252    Opal Huang APRN 04/09/25 1252                     I have discussed and reviewed this treatment plan with the patient.

## 2025-04-09 NOTE — PLAN OF CARE
CBT/Supportive  Allowed patient to process topics such as things have improved greatly in marital relationship, continues to make progress. Individual psychotherapy was provided utilizing solution focused techniques to restore adaptive functioning, provide symptom relief, discuss values and strengths, manage stress, identify triggers, recognize patterns of behavior, acknowledge sources of feelings and behaviors, assess symptoms, provide support, and discuss interpersonal conflicts. The therapeutic alliance was strengthened to encourage the patient to express their thoughts and feelings.     Psychotherapy time 40 minutes.  This time is exclusive to the therapy session and separate from the time spent on activities used to meet the criteria for the E/M service (history, exam, medical decision-making).  Goal is to strengthen defenses, promote problems solving, restore adaptive functioning, and provide symptom relief. Esteem building was enhanced through praise, reassurance, normalizing and encouragement. Coping skills were enhanced to build distress tolerance skills and emotional regulation. Allowed patient to freely discuss issues without interruption or judgement with unconditional positive regard, active listening skills, and empathy. Provided a safe, confidential environment to facilitate the development of a positive therapeutic relationship and encourage open, honest communication. Assisted patient in processing session content, acknowledged and normalized patient’s thoughts, feelings, and concerns by utilizing a person-centered approach in efforts to build appropriate rapport and a positive therapeutic relationship with open and honest communication. Plan to continue supportive psychotherapy in next appointment to provide symptom relief.

## 2025-05-21 DIAGNOSIS — F51.01 PRIMARY INSOMNIA: ICD-10-CM

## 2025-05-21 RX ORDER — ESZOPICLONE 3 MG/1
TABLET, FILM COATED ORAL
Qty: 30 TABLET | Refills: 0 | Status: SHIPPED | OUTPATIENT
Start: 2025-05-23

## 2025-05-21 NOTE — TELEPHONE ENCOUNTER
REFILL REQUEST:    eszopiclone (LUNESTA) 3 MG tablet (04/23/2025)     F/UP: 10/09/2025.  LOV: 04/03/2025.

## 2025-06-12 DIAGNOSIS — F41.1 GENERALIZED ANXIETY DISORDER: ICD-10-CM

## 2025-06-12 DIAGNOSIS — F33.1 MODERATE EPISODE OF RECURRENT MAJOR DEPRESSIVE DISORDER: ICD-10-CM

## 2025-06-12 RX ORDER — VILAZODONE HYDROCHLORIDE 40 MG/1
40 TABLET ORAL DAILY
Qty: 30 TABLET | Refills: 1 | Status: SHIPPED | OUTPATIENT
Start: 2025-06-12

## 2025-06-12 RX ORDER — BUSPIRONE HYDROCHLORIDE 5 MG/1
5 TABLET ORAL 3 TIMES DAILY
Qty: 90 TABLET | Refills: 1 | Status: SHIPPED | OUTPATIENT
Start: 2025-06-12

## 2025-06-12 NOTE — TELEPHONE ENCOUNTER
NEXT VISIT WITH PROVIDER Appointment with Opal Huang APRN (10/09/2025)     LAST SEEN BY PROVIDER Office Visit with Opal Huang APRN (04/03/2025)     LAST MED REFILLbusPIRone (BUSPAR) 5 MG tablet (04/03/2025)       vilazodone (VIIBRYD) 40 MG tablet tablet (04/03/2025)       PROVIDER PLEASE ADVISE

## 2025-06-19 DIAGNOSIS — F51.01 PRIMARY INSOMNIA: ICD-10-CM

## 2025-06-19 NOTE — TELEPHONE ENCOUNTER
REFILL REQUEST:    eszopiclone (LUNESTA) 3 MG tablet (05/23/2025)     F/UP: 10/09/2025.  LOV: 04/03/2025.

## 2025-06-20 RX ORDER — ESZOPICLONE 3 MG/1
TABLET, FILM COATED ORAL
Qty: 30 TABLET | Refills: 1 | Status: SHIPPED | OUTPATIENT
Start: 2025-06-21

## 2025-08-18 DIAGNOSIS — F51.01 PRIMARY INSOMNIA: ICD-10-CM

## 2025-08-18 DIAGNOSIS — F41.1 GENERALIZED ANXIETY DISORDER: ICD-10-CM

## 2025-08-18 DIAGNOSIS — F33.1 MODERATE EPISODE OF RECURRENT MAJOR DEPRESSIVE DISORDER: ICD-10-CM

## 2025-08-18 RX ORDER — BUSPIRONE HYDROCHLORIDE 5 MG/1
5 TABLET ORAL 3 TIMES DAILY
Qty: 90 TABLET | Refills: 1 | Status: SHIPPED | OUTPATIENT
Start: 2025-08-18

## 2025-08-18 RX ORDER — VILAZODONE HYDROCHLORIDE 40 MG/1
40 TABLET ORAL DAILY
Qty: 30 TABLET | Refills: 1 | Status: SHIPPED | OUTPATIENT
Start: 2025-08-18

## 2025-08-18 RX ORDER — ESZOPICLONE 3 MG/1
TABLET, FILM COATED ORAL
Qty: 30 TABLET | Refills: 1 | Status: SHIPPED | OUTPATIENT
Start: 2025-08-27